# Patient Record
Sex: FEMALE | Race: WHITE | NOT HISPANIC OR LATINO | Employment: OTHER | URBAN - METROPOLITAN AREA
[De-identification: names, ages, dates, MRNs, and addresses within clinical notes are randomized per-mention and may not be internally consistent; named-entity substitution may affect disease eponyms.]

---

## 2023-11-17 PROBLEM — K63.5 BENIGN COLONIC POLYP: Status: ACTIVE | Noted: 2023-11-17

## 2023-11-17 PROBLEM — E05.10 THYROID NODULE, TOXIC OR WITH HYPERTHYROIDISM: Status: ACTIVE | Noted: 2023-11-17

## 2023-11-17 PROBLEM — I87.332: Status: ACTIVE | Noted: 2023-11-17

## 2023-11-17 PROBLEM — R92.2 DENSE BREASTS: Status: ACTIVE | Noted: 2023-11-17

## 2023-11-17 PROBLEM — I83.892 VARICOSE VEINS OF LEFT LOWER EXTREMITY WITH COMPLICATIONS: Status: ACTIVE | Noted: 2023-11-17

## 2023-11-17 PROBLEM — E04.1 LEFT THYROID NODULE: Status: ACTIVE | Noted: 2023-11-17

## 2023-11-17 PROBLEM — R79.9 ABNORMAL BLOOD CHEMISTRY: Status: ACTIVE | Noted: 2023-11-17

## 2023-11-17 PROBLEM — C50.911 BREAST CANCER, RIGHT (MULTI): Status: ACTIVE | Noted: 2023-11-17

## 2023-11-17 PROBLEM — R53.83 FATIGUE: Status: ACTIVE | Noted: 2023-11-17

## 2023-11-17 PROBLEM — R92.8 ABNORMAL MAMMOGRAM: Status: ACTIVE | Noted: 2023-11-17

## 2023-11-17 PROBLEM — S81.809A OPEN WOUND OF LEG: Status: ACTIVE | Noted: 2023-11-17

## 2023-11-17 PROBLEM — R92.30 DENSE BREASTS: Status: ACTIVE | Noted: 2023-11-17

## 2023-11-17 PROBLEM — E04.2 MULTIPLE THYROID NODULES: Status: ACTIVE | Noted: 2023-11-17

## 2023-11-17 PROBLEM — R30.0 DYSURIA: Status: ACTIVE | Noted: 2023-11-17

## 2023-11-17 PROBLEM — N63.10 MASS OF BREAST, RIGHT: Status: ACTIVE | Noted: 2023-11-17

## 2023-11-17 PROBLEM — E55.9 VITAMIN D DEFICIENCY: Status: ACTIVE | Noted: 2023-11-17

## 2023-11-17 RX ORDER — CALCIUM CITRATE MALATE/VIT D3 250 MG-2.5
TABLET ORAL
COMMUNITY

## 2023-11-17 RX ORDER — ANASTROZOLE 1 MG/1
1 TABLET ORAL DAILY
COMMUNITY
End: 2024-01-04 | Stop reason: ALTCHOICE

## 2023-11-17 RX ORDER — DORZOLAMIDE HYDROCHLORIDE AND TIMOLOL MALEATE 20; 5 MG/ML; MG/ML
1 SOLUTION/ DROPS OPHTHALMIC 2 TIMES DAILY
COMMUNITY
Start: 2023-04-21 | End: 2024-04-20

## 2023-11-17 RX ORDER — LATANOPROST 50 UG/ML
1 SOLUTION/ DROPS OPHTHALMIC
COMMUNITY
Start: 2023-04-21

## 2023-11-17 RX ORDER — CHOLECALCIFEROL (VITAMIN D3) 25 MCG
TABLET ORAL
COMMUNITY

## 2023-11-17 RX ORDER — MULTIVIT-MIN/FA/LYCOPEN/LUTEIN .4-300-25
TABLET ORAL
COMMUNITY

## 2023-12-08 ENCOUNTER — HOSPITAL ENCOUNTER (OUTPATIENT)
Dept: RADIOLOGY | Facility: HOSPITAL | Age: 73
Discharge: HOME | End: 2023-12-08
Payer: MEDICARE

## 2023-12-08 VITALS — HEIGHT: 64 IN | WEIGHT: 188 LBS | BODY MASS INDEX: 32.1 KG/M2

## 2023-12-08 DIAGNOSIS — C50.919 MALIGNANT NEOPLASM OF UNSPECIFIED SITE OF UNSPECIFIED FEMALE BREAST (MULTI): ICD-10-CM

## 2023-12-08 DIAGNOSIS — Z12.31 ENCOUNTER FOR SCREENING MAMMOGRAM FOR MALIGNANT NEOPLASM OF BREAST: ICD-10-CM

## 2023-12-08 PROCEDURE — 77067 SCR MAMMO BI INCL CAD: CPT | Mod: BILATERAL PROCEDURE | Performed by: RADIOLOGY

## 2023-12-08 PROCEDURE — 77063 BREAST TOMOSYNTHESIS BI: CPT | Mod: BILATERAL PROCEDURE | Performed by: RADIOLOGY

## 2023-12-08 PROCEDURE — 77063 BREAST TOMOSYNTHESIS BI: CPT

## 2023-12-08 NOTE — PROGRESS NOTES
Patient ID: Charlotte Daniels is a 73 y.o. female.  Cancer History:   Treatment Synopsis:       1. 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  12/20/17  2. Status post adjuvant radiation therapy to right breast.   3. Started on anastrozole 3/27/18. Completed 5 yrs of therapy in 3/2023.      Subjective    HPI  Seen in follow up for her history of R breast cancer.   Energy is lower than last year. Still does normal activities, but goes slower. Still gardens, etc.   Appetite is fine. Upset about weight.   Denies sob/chest pain. Denies cough/fevers lately.   Denies n/v.   Bowels okay.   Denies lumps in chest.   R knee hurts at times, but otherwise denies new or unusual pain. Hasn't had her knee checked.       Objective    BSA: 1.97 meters squared  /87 (BP Location: Right arm, Patient Position: Sitting)   Pulse 88   Temp 36.2 °C (97.2 °F) (Temporal)   Resp 16   Wt 85.6 kg (188 lb 11.4 oz)   SpO2 98%   BMI 32.39 kg/m²      Physical Exam  Vitals reviewed.   Eyes:      General: No scleral icterus.  Cardiovascular:      Rate and Rhythm: Normal rate and regular rhythm.   Pulmonary:      Effort: Pulmonary effort is normal. No respiratory distress.      Breath sounds: Normal breath sounds. No wheezing or rales.   Abdominal:      General: Bowel sounds are normal. There is no distension.      Palpations: Abdomen is soft. There is no mass.      Tenderness: There is no abdominal tenderness. There is no guarding.   Musculoskeletal:      Right lower leg: No edema.      Left lower leg: No edema.      Comments: No pain to palpation of spine    Lymphadenopathy:      Cervical: No cervical adenopathy.      Comments: No adenopathy head/neck/axilla  R lumpectomy noted   No lumps or nodules appreciated in either breast   Skin:     General: Skin is warm and dry.   Neurological:      Mental Status: She is  alert and oriented to person, place, and time.   Psychiatric:         Mood and Affect: Mood normal.         Behavior: Behavior normal.         Performance Status:  Symptomatic; fully ambulatory      Assessment/Plan   Mammogram on 12/8- report pending    As long as her mammogram is read as benign, she can follow up here prn. She can get her annual mammogram and exam through her PCP. Patient is agreeable.     Re: her weight. Recommended a diet rich in a variety of vegetables and fruits. Limit red meat. Limit sugar intake. Watch serving sizes. Discussed tracking intake on an gem.     Encouraged exercise. Aim for at least 150 min of exercise per week. She walks some and does some weights, but admits to not really exercising over the summer.     Re: slowing down. She is sleeping okay. Would normally start with checking labs such as cbc, tsh. She is seeing her PCP next month and will be getting labs then. She will talk to her PCP about her knee discomfort.               Miryam Elaine, APRN-CNP

## 2023-12-11 ENCOUNTER — OFFICE VISIT (OUTPATIENT)
Dept: HEMATOLOGY/ONCOLOGY | Facility: CLINIC | Age: 73
End: 2023-12-11
Payer: MEDICARE

## 2023-12-11 VITALS
SYSTOLIC BLOOD PRESSURE: 141 MMHG | RESPIRATION RATE: 16 BRPM | DIASTOLIC BLOOD PRESSURE: 87 MMHG | TEMPERATURE: 97.2 F | BODY MASS INDEX: 32.39 KG/M2 | OXYGEN SATURATION: 98 % | HEART RATE: 88 BPM | WEIGHT: 188.71 LBS

## 2023-12-11 DIAGNOSIS — Z17.0 MALIGNANT NEOPLASM OF RIGHT BREAST IN FEMALE, ESTROGEN RECEPTOR POSITIVE, UNSPECIFIED SITE OF BREAST (MULTI): Primary | ICD-10-CM

## 2023-12-11 DIAGNOSIS — C50.911 MALIGNANT NEOPLASM OF RIGHT BREAST IN FEMALE, ESTROGEN RECEPTOR POSITIVE, UNSPECIFIED SITE OF BREAST (MULTI): Primary | ICD-10-CM

## 2023-12-11 PROCEDURE — 1126F AMNT PAIN NOTED NONE PRSNT: CPT | Performed by: NURSE PRACTITIONER

## 2023-12-11 PROCEDURE — 99213 OFFICE O/P EST LOW 20 MIN: CPT | Performed by: NURSE PRACTITIONER

## 2023-12-11 PROCEDURE — 1159F MED LIST DOCD IN RCRD: CPT | Performed by: NURSE PRACTITIONER

## 2023-12-11 RX ORDER — BROMFENAC 1.03 MG/ML
1 SOLUTION/ DROPS OPHTHALMIC 2 TIMES DAILY
COMMUNITY
End: 2024-01-04 | Stop reason: ALTCHOICE

## 2023-12-11 ASSESSMENT — ENCOUNTER SYMPTOMS
LOSS OF SENSATION IN FEET: 0
OCCASIONAL FEELINGS OF UNSTEADINESS: 0
DEPRESSION: 0

## 2023-12-11 ASSESSMENT — PATIENT HEALTH QUESTIONNAIRE - PHQ9
2. FEELING DOWN, DEPRESSED OR HOPELESS: NOT AT ALL
1. LITTLE INTEREST OR PLEASURE IN DOING THINGS: NOT AT ALL
SUM OF ALL RESPONSES TO PHQ9 QUESTIONS 1 AND 2: 0

## 2023-12-11 ASSESSMENT — PAIN SCALES - GENERAL: PAINLEVEL: 0-NO PAIN

## 2023-12-11 ASSESSMENT — COLUMBIA-SUICIDE SEVERITY RATING SCALE - C-SSRS
1. IN THE PAST MONTH, HAVE YOU WISHED YOU WERE DEAD OR WISHED YOU COULD GO TO SLEEP AND NOT WAKE UP?: NO
2. HAVE YOU ACTUALLY HAD ANY THOUGHTS OF KILLING YOURSELF?: NO
6. HAVE YOU EVER DONE ANYTHING, STARTED TO DO ANYTHING, OR PREPARED TO DO ANYTHING TO END YOUR LIFE?: NO

## 2023-12-12 ENCOUNTER — TELEPHONE (OUTPATIENT)
Dept: HEMATOLOGY/ONCOLOGY | Facility: CLINIC | Age: 73
End: 2023-12-12
Payer: MEDICARE

## 2023-12-12 NOTE — TELEPHONE ENCOUNTER
BI mammo bilateral screening tomosynthesis    Result Date: 12/12/2023  Impression: No mammographic evidence of malignancy.   BI-RADS CATEGORY: BI-RADS Category:  2 Benign. Recommendation:  Routine Screening Mammogram in 1 Year. Recommended Date:  1 Year. Laterality:  Bilateral.   For any future breast imaging appointments, please call [phone]).     MACRO: None   Signed by: Singh Wu 12/12/2023 4:23 PM Dictation workstation:   [id number]        Called patient with mammogram read. It is fine. Left message with her spouse.

## 2024-01-04 ENCOUNTER — OFFICE VISIT (OUTPATIENT)
Dept: PRIMARY CARE | Facility: CLINIC | Age: 74
End: 2024-01-04
Payer: MEDICARE

## 2024-01-04 VITALS
RESPIRATION RATE: 16 BRPM | TEMPERATURE: 97 F | HEART RATE: 80 BPM | WEIGHT: 190 LBS | BODY MASS INDEX: 34.96 KG/M2 | DIASTOLIC BLOOD PRESSURE: 86 MMHG | HEIGHT: 62 IN | SYSTOLIC BLOOD PRESSURE: 142 MMHG

## 2024-01-04 DIAGNOSIS — E55.9 VITAMIN D DEFICIENCY: ICD-10-CM

## 2024-01-04 DIAGNOSIS — Z00.00 ROUTINE GENERAL MEDICAL EXAMINATION AT HEALTH CARE FACILITY: Primary | ICD-10-CM

## 2024-01-04 DIAGNOSIS — E05.10 THYROID NODULE, TOXIC OR WITH HYPERTHYROIDISM: ICD-10-CM

## 2024-01-04 DIAGNOSIS — Z23 NEED FOR VACCINATION: ICD-10-CM

## 2024-01-04 DIAGNOSIS — Z17.0 MALIGNANT NEOPLASM OF RIGHT BREAST IN FEMALE, ESTROGEN RECEPTOR POSITIVE, UNSPECIFIED SITE OF BREAST (MULTI): ICD-10-CM

## 2024-01-04 DIAGNOSIS — Z13.220 LIPID SCREENING: ICD-10-CM

## 2024-01-04 DIAGNOSIS — E89.0 HISTORY OF PARTIAL THYROIDECTOMY: ICD-10-CM

## 2024-01-04 DIAGNOSIS — C50.911 MALIGNANT NEOPLASM OF RIGHT BREAST IN FEMALE, ESTROGEN RECEPTOR POSITIVE, UNSPECIFIED SITE OF BREAST (MULTI): ICD-10-CM

## 2024-01-04 DIAGNOSIS — R53.83 OTHER FATIGUE: ICD-10-CM

## 2024-01-04 DIAGNOSIS — Z12.31 ENCOUNTER FOR SCREENING MAMMOGRAM FOR MALIGNANT NEOPLASM OF BREAST: ICD-10-CM

## 2024-01-04 PROBLEM — H52.13 HIGH MYOPIA, BOTH EYES: Status: ACTIVE | Noted: 2018-04-10

## 2024-01-04 PROBLEM — H35.371 EPIRETINAL MEMBRANE (ERM) OF RIGHT EYE: Status: ACTIVE | Noted: 2018-03-28

## 2024-01-04 PROBLEM — H47.393 CUP TO DISC ASYMMETRY, BILATERAL: Status: ACTIVE | Noted: 2018-04-10

## 2024-01-04 PROBLEM — H43.813 PVD (POSTERIOR VITREOUS DETACHMENT), BOTH EYES: Status: ACTIVE | Noted: 2018-03-06

## 2024-01-04 PROBLEM — Z96.1 PSEUDOPHAKIA, BOTH EYES: Status: ACTIVE | Noted: 2018-08-06

## 2024-01-04 PROCEDURE — 1157F ADVNC CARE PLAN IN RCRD: CPT | Performed by: NURSE PRACTITIONER

## 2024-01-04 PROCEDURE — 1036F TOBACCO NON-USER: CPT | Performed by: NURSE PRACTITIONER

## 2024-01-04 PROCEDURE — 1160F RVW MEDS BY RX/DR IN RCRD: CPT | Performed by: NURSE PRACTITIONER

## 2024-01-04 PROCEDURE — 1123F ACP DISCUSS/DSCN MKR DOCD: CPT | Performed by: NURSE PRACTITIONER

## 2024-01-04 PROCEDURE — 1159F MED LIST DOCD IN RCRD: CPT | Performed by: NURSE PRACTITIONER

## 2024-01-04 PROCEDURE — G0439 PPPS, SUBSEQ VISIT: HCPCS | Performed by: NURSE PRACTITIONER

## 2024-01-04 PROCEDURE — G0008 ADMIN INFLUENZA VIRUS VAC: HCPCS | Performed by: NURSE PRACTITIONER

## 2024-01-04 PROCEDURE — 1126F AMNT PAIN NOTED NONE PRSNT: CPT | Performed by: NURSE PRACTITIONER

## 2024-01-04 PROCEDURE — 1170F FXNL STATUS ASSESSED: CPT | Performed by: NURSE PRACTITIONER

## 2024-01-04 PROCEDURE — 90662 IIV NO PRSV INCREASED AG IM: CPT | Performed by: NURSE PRACTITIONER

## 2024-01-04 RX ORDER — BRIMONIDINE TARTRATE 2 MG/ML
1 SOLUTION/ DROPS OPHTHALMIC 2 TIMES DAILY
COMMUNITY

## 2024-01-04 ASSESSMENT — PATIENT HEALTH QUESTIONNAIRE - PHQ9
2. FEELING DOWN, DEPRESSED OR HOPELESS: NOT AT ALL
1. LITTLE INTEREST OR PLEASURE IN DOING THINGS: NOT AT ALL
SUM OF ALL RESPONSES TO PHQ9 QUESTIONS 1 & 2: 0
SUM OF ALL RESPONSES TO PHQ9 QUESTIONS 1 AND 2: 0
2. FEELING DOWN, DEPRESSED OR HOPELESS: NOT AT ALL
1. LITTLE INTEREST OR PLEASURE IN DOING THINGS: NOT AT ALL

## 2024-01-04 ASSESSMENT — ACTIVITIES OF DAILY LIVING (ADL)
TAKING_MEDICATION: INDEPENDENT
DRESSING: INDEPENDENT
MANAGING_FINANCES: INDEPENDENT
GROCERY_SHOPPING: INDEPENDENT
BATHING: INDEPENDENT
DOING_HOUSEWORK: INDEPENDENT

## 2024-01-04 ASSESSMENT — ENCOUNTER SYMPTOMS
OCCASIONAL FEELINGS OF UNSTEADINESS: 0
LOSS OF SENSATION IN FEET: 0
DEPRESSION: 0

## 2024-01-04 NOTE — PROGRESS NOTES
"Subjective   Reason for Visit: Charlotte Daniels is an 74 y.o. female here for a Medicare Wellness visit.     Past Medical, Surgical, and Family History reviewed and updated in chart.    She saw Miryam Elaine on 12/11/2023, who discharged her from the breast program.     She eats protein, vegetables, and a starch. She is working on exercise. She drinks tea but is trying to increase her water intake. She has 2-3 cups of coffee per day.     Well Adult Physical   Patient here for a comprehensive physical exam.The patient reports no problems    Do you take any herbs or supplements that were not prescribed by a doctor? not asked   Are you taking calcium supplements? yes   Are you taking aspirin daily? no     History:  LMP: No LMP recorded. Patient is postmenopausal.  Mammogram 12/2023, Colonoscopy 11/2020            Patient Care Team:  BILL Villra as PCP - General (Internal Medicine)     Review of Systems   Constitutional:  Negative for chills, fatigue and fever.   HENT:  Negative for congestion, ear pain, rhinorrhea, sinus pressure and sore throat.    Eyes:  Negative for pain, discharge and itching.   Respiratory:  Negative for cough, shortness of breath and wheezing.    Cardiovascular:  Negative for chest pain and palpitations.   Gastrointestinal:  Negative for constipation, diarrhea, nausea and vomiting.   Genitourinary:  Negative for difficulty urinating and dysuria.   Musculoskeletal:  Negative for back pain, joint swelling and myalgias.   Skin:  Negative for color change.   Neurological:  Negative for headaches.   Hematological:  Negative for adenopathy.   Psychiatric/Behavioral:  Negative for decreased concentration. The patient is not nervous/anxious.        Objective   Vitals:  /86   Pulse 80   Temp 36.1 °C (97 °F) (Temporal)   Resp 16   Ht 1.575 m (5' 2\")   Wt 86.2 kg (190 lb)   BMI 34.75 kg/m²       Physical Exam  Constitutional:       General: She is not in acute distress.     " Appearance: She is not ill-appearing.   HENT:      Head: Normocephalic and atraumatic.      Right Ear: Tympanic membrane, ear canal and external ear normal.      Left Ear: Tympanic membrane, ear canal and external ear normal.      Nose: Nose normal.      Mouth/Throat:      Mouth: Mucous membranes are moist.      Pharynx: Oropharynx is clear.   Eyes:      Conjunctiva/sclera: Conjunctivae normal.      Pupils: Pupils are equal, round, and reactive to light.   Cardiovascular:      Rate and Rhythm: Normal rate and regular rhythm.      Pulses: Normal pulses.      Heart sounds: Normal heart sounds.   Pulmonary:      Effort: Pulmonary effort is normal. No respiratory distress.      Breath sounds: Normal breath sounds.   Abdominal:      General: Bowel sounds are normal.      Palpations: Abdomen is soft.      Tenderness: There is no abdominal tenderness.   Musculoskeletal:         General: Normal range of motion.   Skin:     General: Skin is warm and dry.   Neurological:      General: No focal deficit present.      Mental Status: She is alert and oriented to person, place, and time.   Psychiatric:         Mood and Affect: Mood normal.         Behavior: Behavior normal.         Thought Content: Thought content normal.         Judgment: Judgment normal.         Assessment/Plan   Problem List Items Addressed This Visit       Breast cancer, right (CMS/HCC)    Fatigue    Relevant Orders    CBC and Auto Differential (Completed)    Comprehensive Metabolic Panel (Completed)    Thyroid nodule, toxic or with hyperthyroidism    Relevant Orders    TSH with reflex to Free T4 if abnormal (Completed)    Vitamin D deficiency    Relevant Orders    Vitamin D 25-Hydroxy,Total (for eval of Vitamin D levels) (Completed)    History of partial thyroidectomy    Relevant Orders    TSH with reflex to Free T4 if abnormal (Completed)     Other Visit Diagnoses       Routine general medical examination at health care facility    -  Primary    Relevant  Orders    1 Year Follow Up In [facility] - PCP - Wellness Exam    Encounter for screening mammogram for malignant neoplasm of breast        Relevant Orders    BI mammo bilateral screening tomosynthesis    Need for vaccination        Relevant Orders    Flu vaccine, high dose seasonal, preservative free (Completed)    Lipid screening        Relevant Orders    Lipid Panel (Completed)          Patient Instructions   Patient to continue medications as ordered. Have fasting labs drawn, and we will call with results when available. Follow-up in 1 year, or sooner if needed. Call the office if any problems or concerns in the meantime.

## 2024-01-12 ENCOUNTER — LAB (OUTPATIENT)
Dept: LAB | Facility: LAB | Age: 74
End: 2024-01-12
Payer: MEDICARE

## 2024-01-12 DIAGNOSIS — R53.83 OTHER FATIGUE: ICD-10-CM

## 2024-01-12 DIAGNOSIS — E05.10 THYROID NODULE, TOXIC OR WITH HYPERTHYROIDISM: ICD-10-CM

## 2024-01-12 DIAGNOSIS — Z13.220 LIPID SCREENING: ICD-10-CM

## 2024-01-12 DIAGNOSIS — E55.9 VITAMIN D DEFICIENCY: ICD-10-CM

## 2024-01-12 DIAGNOSIS — E89.0 HISTORY OF PARTIAL THYROIDECTOMY: ICD-10-CM

## 2024-01-12 LAB
25(OH)D3 SERPL-MCNC: 58 NG/ML (ref 30–100)
ALBUMIN SERPL BCP-MCNC: 4.4 G/DL (ref 3.4–5)
ALP SERPL-CCNC: 74 U/L (ref 33–136)
ALT SERPL W P-5'-P-CCNC: 17 U/L (ref 7–45)
ANION GAP SERPL CALC-SCNC: 12 MMOL/L (ref 10–20)
AST SERPL W P-5'-P-CCNC: 18 U/L (ref 9–39)
BASOPHILS # BLD AUTO: 0.05 X10*3/UL (ref 0–0.1)
BASOPHILS NFR BLD AUTO: 1 %
BILIRUB SERPL-MCNC: 1.1 MG/DL (ref 0–1.2)
BUN SERPL-MCNC: 9 MG/DL (ref 6–23)
CALCIUM SERPL-MCNC: 9.4 MG/DL (ref 8.6–10.3)
CHLORIDE SERPL-SCNC: 100 MMOL/L (ref 98–107)
CHOLEST SERPL-MCNC: 237 MG/DL (ref 0–199)
CHOLESTEROL/HDL RATIO: 3.7
CO2 SERPL-SCNC: 27 MMOL/L (ref 21–32)
CREAT SERPL-MCNC: 0.66 MG/DL (ref 0.5–1.05)
EGFRCR SERPLBLD CKD-EPI 2021: >90 ML/MIN/1.73M*2
EOSINOPHIL # BLD AUTO: 0.15 X10*3/UL (ref 0–0.4)
EOSINOPHIL NFR BLD AUTO: 3.1 %
ERYTHROCYTE [DISTWIDTH] IN BLOOD BY AUTOMATED COUNT: 12.3 % (ref 11.5–14.5)
GLUCOSE SERPL-MCNC: 103 MG/DL (ref 74–99)
HCT VFR BLD AUTO: 44.1 % (ref 36–46)
HDLC SERPL-MCNC: 63.9 MG/DL
HGB BLD-MCNC: 14.5 G/DL (ref 12–16)
IMM GRANULOCYTES # BLD AUTO: 0 X10*3/UL (ref 0–0.5)
IMM GRANULOCYTES NFR BLD AUTO: 0 % (ref 0–0.9)
LDLC SERPL CALC-MCNC: 145 MG/DL
LYMPHOCYTES # BLD AUTO: 1.51 X10*3/UL (ref 0.8–3)
LYMPHOCYTES NFR BLD AUTO: 31.3 %
MCH RBC QN AUTO: 31 PG (ref 26–34)
MCHC RBC AUTO-ENTMCNC: 32.9 G/DL (ref 32–36)
MCV RBC AUTO: 94 FL (ref 80–100)
MONOCYTES # BLD AUTO: 0.42 X10*3/UL (ref 0.05–0.8)
MONOCYTES NFR BLD AUTO: 8.7 %
NEUTROPHILS # BLD AUTO: 2.7 X10*3/UL (ref 1.6–5.5)
NEUTROPHILS NFR BLD AUTO: 55.9 %
NON HDL CHOLESTEROL: 173 MG/DL (ref 0–149)
NRBC BLD-RTO: 0 /100 WBCS (ref 0–0)
PLATELET # BLD AUTO: 183 X10*3/UL (ref 150–450)
POTASSIUM SERPL-SCNC: 3.7 MMOL/L (ref 3.5–5.3)
PROT SERPL-MCNC: 7.5 G/DL (ref 6.4–8.2)
RBC # BLD AUTO: 4.68 X10*6/UL (ref 4–5.2)
SODIUM SERPL-SCNC: 135 MMOL/L (ref 136–145)
T4 FREE SERPL-MCNC: 0.81 NG/DL (ref 0.61–1.12)
TRIGL SERPL-MCNC: 141 MG/DL (ref 0–149)
TSH SERPL-ACNC: 4.7 MIU/L (ref 0.44–3.98)
VLDL: 28 MG/DL (ref 0–40)
WBC # BLD AUTO: 4.8 X10*3/UL (ref 4.4–11.3)

## 2024-01-12 PROCEDURE — 80053 COMPREHEN METABOLIC PANEL: CPT

## 2024-01-12 PROCEDURE — 36415 COLL VENOUS BLD VENIPUNCTURE: CPT

## 2024-01-12 PROCEDURE — 80061 LIPID PANEL: CPT

## 2024-01-12 PROCEDURE — 84439 ASSAY OF FREE THYROXINE: CPT

## 2024-01-12 PROCEDURE — 85025 COMPLETE CBC W/AUTO DIFF WBC: CPT

## 2024-01-12 PROCEDURE — 82306 VITAMIN D 25 HYDROXY: CPT

## 2024-01-12 PROCEDURE — 84443 ASSAY THYROID STIM HORMONE: CPT

## 2024-01-17 ASSESSMENT — ENCOUNTER SYMPTOMS
SORE THROAT: 0
CONSTIPATION: 0
EYE ITCHING: 0
BACK PAIN: 0
DECREASED CONCENTRATION: 0
PALPITATIONS: 0
EYE PAIN: 0
DYSURIA: 0
COLOR CHANGE: 0
DIFFICULTY URINATING: 0
COUGH: 0
ADENOPATHY: 0
SHORTNESS OF BREATH: 0
EYE DISCHARGE: 0
JOINT SWELLING: 0
WHEEZING: 0
DIARRHEA: 0
NAUSEA: 0
RHINORRHEA: 0
FEVER: 0
CHILLS: 0
FATIGUE: 0
VOMITING: 0
SINUS PRESSURE: 0
NERVOUS/ANXIOUS: 0
MYALGIAS: 0
HEADACHES: 0

## 2024-01-18 NOTE — PATIENT INSTRUCTIONS
Patient to continue medications as ordered. Have fasting labs drawn, and we will call with results when available. Follow-up in 1 year, or sooner if needed. Call the office if any problems or concerns in the meantime.

## 2024-03-12 PROBLEM — I87.332: Status: RESOLVED | Noted: 2023-11-17 | Resolved: 2024-03-12

## 2024-04-05 ENCOUNTER — TELEPHONE (OUTPATIENT)
Dept: PRIMARY CARE | Facility: CLINIC | Age: 74
End: 2024-04-05
Payer: MEDICARE

## 2024-04-05 NOTE — TELEPHONE ENCOUNTER
Patient called with a concern.  She stated she has had two such episodes as will be described.    When pushing herself physically she is having bathroom troubles.  She feels she needs to remain close to a facility.  When she needs to go, she needs to go and there's no waiting.    Each episode has lasted about a week to a week and a half. It's not continuous but it may last a day or two off and on during the episode.    This second occurrence happened while on a cruise.  She had to use the bathroom multiple times.  The stool wasn't completely solid but not runny.  It was more mucous like.  She is concerned and not sure what do.      Can she get a phone call?  Does she need an appointment?

## 2024-06-06 ENCOUNTER — OFFICE VISIT (OUTPATIENT)
Dept: PRIMARY CARE | Facility: CLINIC | Age: 74
End: 2024-06-06
Payer: MEDICARE

## 2024-06-06 ENCOUNTER — LAB (OUTPATIENT)
Dept: LAB | Facility: LAB | Age: 74
End: 2024-06-06
Payer: MEDICARE

## 2024-06-06 VITALS
DIASTOLIC BLOOD PRESSURE: 80 MMHG | BODY MASS INDEX: 34.96 KG/M2 | SYSTOLIC BLOOD PRESSURE: 134 MMHG | HEIGHT: 62 IN | WEIGHT: 190 LBS | OXYGEN SATURATION: 97 % | RESPIRATION RATE: 18 BRPM | HEART RATE: 52 BPM

## 2024-06-06 DIAGNOSIS — E05.10 THYROID NODULE, TOXIC OR WITH HYPERTHYROIDISM: ICD-10-CM

## 2024-06-06 DIAGNOSIS — R53.83 OTHER FATIGUE: Primary | ICD-10-CM

## 2024-06-06 DIAGNOSIS — E55.9 VITAMIN D DEFICIENCY: ICD-10-CM

## 2024-06-06 DIAGNOSIS — E89.0 HISTORY OF PARTIAL THYROIDECTOMY: ICD-10-CM

## 2024-06-06 DIAGNOSIS — R53.83 OTHER FATIGUE: ICD-10-CM

## 2024-06-06 LAB
T4 FREE SERPL-MCNC: 0.74 NG/DL (ref 0.61–1.12)
TSH SERPL-ACNC: 4.12 MIU/L (ref 0.44–3.98)

## 2024-06-06 PROCEDURE — 36415 COLL VENOUS BLD VENIPUNCTURE: CPT

## 2024-06-06 PROCEDURE — 1159F MED LIST DOCD IN RCRD: CPT | Performed by: NURSE PRACTITIONER

## 2024-06-06 PROCEDURE — 1157F ADVNC CARE PLAN IN RCRD: CPT | Performed by: NURSE PRACTITIONER

## 2024-06-06 PROCEDURE — 1123F ACP DISCUSS/DSCN MKR DOCD: CPT | Performed by: NURSE PRACTITIONER

## 2024-06-06 PROCEDURE — 84439 ASSAY OF FREE THYROXINE: CPT

## 2024-06-06 PROCEDURE — 1160F RVW MEDS BY RX/DR IN RCRD: CPT | Performed by: NURSE PRACTITIONER

## 2024-06-06 PROCEDURE — 84443 ASSAY THYROID STIM HORMONE: CPT

## 2024-06-06 PROCEDURE — 99213 OFFICE O/P EST LOW 20 MIN: CPT | Performed by: NURSE PRACTITIONER

## 2024-06-06 RX ORDER — DORZOLAMIDE HYDROCHLORIDE AND TIMOLOL MALEATE 20; 5 MG/ML; MG/ML
1 SOLUTION/ DROPS OPHTHALMIC 2 TIMES DAILY
COMMUNITY

## 2024-06-06 ASSESSMENT — ENCOUNTER SYMPTOMS
EYE PAIN: 0
FEVER: 0
NUMBNESS: 0
SHORTNESS OF BREATH: 0
WOUND: 0
WEAKNESS: 0
SINUS PAIN: 0
ARTHRALGIAS: 0
FATIGUE: 1
SORE THROAT: 0
CHEST TIGHTNESS: 0
DIARRHEA: 1
CHILLS: 0
CONFUSION: 0
LIGHT-HEADEDNESS: 0
SINUS PRESSURE: 0
DECREASED CONCENTRATION: 0
VOMITING: 0
CONSTIPATION: 1
NAUSEA: 0
ABDOMINAL PAIN: 0
MYALGIAS: 0
DYSURIA: 0
HEADACHES: 0

## 2024-06-06 NOTE — PROGRESS NOTES
"Subjective   Patient ID: Charlotte Daniels is a 74 y.o. female who presents for abdominal concerns.    HPI     Review of Systems    Objective   /80   Pulse 52   Resp 18   Ht 1.575 m (5' 2\")   Wt 86.2 kg (190 lb)   SpO2 97%   BMI 34.75 kg/m²     Physical Exam    Assessment/Plan          "

## 2024-06-06 NOTE — PROGRESS NOTES
"Subjective   Patient ID: Charlotte Daniels is a 74 y.o. female who presents for abdominal concerns.    Patient presents today with complaints of abdominal issues.  She said that she was travelling in March and had an issue with constipation.  She noticed she had some blood when wiping and wanted to follow-up with this.  She had a colonoscopy in 2022 in which they did find internal hemorrhoids and she believe this may be the cause of the blood. She hasbn't had any blood in the stool since then. She normally has a few bowel movements every morning.     She says her diet is well-balanced with proteins, fruits, and vegetables.  She drinks water throughout the day and walks for exercise.  She drinks coffee in the morning but has no other caffeine intake.  She does state that she is feeling more fatigued and tries to \"push through\" the days.  She would like to discuss potential issues with her thyroid as well. She is sleeping well at night.         Review of Systems   Constitutional:  Positive for fatigue. Negative for chills and fever.   HENT:  Negative for congestion, sinus pressure, sinus pain and sore throat.    Eyes:  Negative for pain.   Respiratory:  Negative for chest tightness and shortness of breath.    Cardiovascular:  Negative for chest pain.   Gastrointestinal:  Positive for constipation and diarrhea. Negative for abdominal pain, nausea and vomiting.   Genitourinary:  Negative for dysuria.   Musculoskeletal:  Negative for arthralgias and myalgias.   Skin:  Negative for rash and wound.   Neurological:  Negative for weakness, light-headedness, numbness and headaches.   Psychiatric/Behavioral:  Negative for confusion and decreased concentration.        Objective   /80   Pulse 52   Resp 18   Ht 1.575 m (5' 2\")   Wt 86.2 kg (190 lb)   SpO2 97%   BMI 34.75 kg/m²     Physical Exam  Constitutional:       Appearance: Normal appearance.   HENT:      Head: Normocephalic and atraumatic.      Mouth/Throat:    "   Mouth: Mucous membranes are moist.   Eyes:      Conjunctiva/sclera: Conjunctivae normal.      Pupils: Pupils are equal, round, and reactive to light.   Cardiovascular:      Rate and Rhythm: Normal rate and regular rhythm.      Heart sounds: Normal heart sounds. No murmur heard.  Pulmonary:      Effort: Pulmonary effort is normal.      Breath sounds: Normal breath sounds.   Abdominal:      General: Bowel sounds are normal. There is no distension.      Palpations: Abdomen is soft. There is no mass.      Tenderness: There is no abdominal tenderness.   Musculoskeletal:         General: Normal range of motion.      Cervical back: Normal range of motion.   Skin:     General: Skin is warm and dry.   Neurological:      General: No focal deficit present.      Mental Status: She is alert and oriented to person, place, and time.   Psychiatric:         Mood and Affect: Mood normal.         Behavior: Behavior normal.         Thought Content: Thought content normal.         Judgment: Judgment normal.         Assessment/Plan   Problem List Items Addressed This Visit       Fatigue - Primary    Relevant Orders    Tsh With Reflex To Free T4 If Abnormal (Completed)    Thyroid nodule, toxic or with hyperthyroidism    Relevant Medications    levothyroxine (Synthroid) 25 mcg tablet    Other Relevant Orders    TSH    Triiodothyronine, Free    Thyroxine, Free    Vitamin D deficiency    History of partial thyroidectomy       Patient Instructions   Encouraged to monitor bowel movement frequency and check for blood/hemorrhoids prn. Patient to have TSH drawn on the way out, will start synthroid pending results. Follow-up in 3 months, or sooner if needed. Call the office if any problems or concerns in the meantime.

## 2024-06-26 RX ORDER — LEVOTHYROXINE SODIUM 25 UG/1
25 TABLET ORAL DAILY
Qty: 90 TABLET | Refills: 1 | Status: SHIPPED | OUTPATIENT
Start: 2024-06-26 | End: 2024-12-23

## 2024-06-26 NOTE — PATIENT INSTRUCTIONS
Encouraged to monitor bowel movement frequency and check for blood/hemorrhoids prn. Patient to have TSH drawn on the way out, will start synthroid pending results. Follow-up in 3 months, or sooner if needed. Call the office if any problems or concerns in the meantime.

## 2024-09-09 DIAGNOSIS — E05.10 THYROID NODULE, TOXIC OR WITH HYPERTHYROIDISM: ICD-10-CM

## 2024-09-09 RX ORDER — LEVOTHYROXINE SODIUM 25 UG/1
25 TABLET ORAL DAILY
Qty: 30 TABLET | Refills: 1 | Status: SHIPPED | OUTPATIENT
Start: 2024-09-17 | End: 2025-03-16

## 2024-09-26 ENCOUNTER — LAB (OUTPATIENT)
Dept: LAB | Facility: LAB | Age: 74
End: 2024-09-26
Payer: MEDICARE

## 2024-09-26 DIAGNOSIS — E05.10 THYROID NODULE, TOXIC OR WITH HYPERTHYROIDISM: ICD-10-CM

## 2024-09-26 LAB
T3FREE SERPL-MCNC: 3.1 PG/ML (ref 2.3–4.2)
T4 FREE SERPL-MCNC: 0.95 NG/DL (ref 0.61–1.12)
TSH SERPL-ACNC: 3.48 MIU/L (ref 0.44–3.98)

## 2024-09-26 PROCEDURE — 84481 FREE ASSAY (FT-3): CPT

## 2024-09-26 PROCEDURE — 36415 COLL VENOUS BLD VENIPUNCTURE: CPT

## 2024-09-26 PROCEDURE — 84439 ASSAY OF FREE THYROXINE: CPT

## 2024-09-26 PROCEDURE — 84443 ASSAY THYROID STIM HORMONE: CPT

## 2024-10-11 ENCOUNTER — APPOINTMENT (OUTPATIENT)
Dept: ORTHOPEDIC SURGERY | Facility: CLINIC | Age: 74
End: 2024-10-11
Payer: MEDICARE

## 2024-10-11 ENCOUNTER — ANCILLARY PROCEDURE (OUTPATIENT)
Dept: ORTHOPEDIC SURGERY | Facility: CLINIC | Age: 74
End: 2024-10-11
Payer: MEDICARE

## 2024-10-11 ENCOUNTER — HOSPITAL ENCOUNTER (OUTPATIENT)
Dept: RADIOLOGY | Facility: HOSPITAL | Age: 74
End: 2024-10-11
Payer: MEDICARE

## 2024-10-11 ENCOUNTER — OFFICE VISIT (OUTPATIENT)
Dept: ORTHOPEDIC SURGERY | Facility: CLINIC | Age: 74
End: 2024-10-11
Payer: MEDICARE

## 2024-10-11 ENCOUNTER — HOSPITAL ENCOUNTER (OUTPATIENT)
Dept: RADIOLOGY | Facility: HOSPITAL | Age: 74
Discharge: HOME | End: 2024-10-11
Payer: MEDICARE

## 2024-10-11 ENCOUNTER — APPOINTMENT (OUTPATIENT)
Dept: RADIOLOGY | Facility: HOSPITAL | Age: 74
End: 2024-10-11
Payer: MEDICARE

## 2024-10-11 DIAGNOSIS — M25.472 PAIN AND SWELLING OF LEFT ANKLE: ICD-10-CM

## 2024-10-11 DIAGNOSIS — M25.572 PAIN AND SWELLING OF LEFT ANKLE: ICD-10-CM

## 2024-10-11 DIAGNOSIS — M25.561 RIGHT KNEE PAIN, UNSPECIFIED CHRONICITY: ICD-10-CM

## 2024-10-11 DIAGNOSIS — M17.11 OSTEOARTHRITIS OF RIGHT KNEE, UNSPECIFIED OSTEOARTHRITIS TYPE: ICD-10-CM

## 2024-10-11 DIAGNOSIS — M76.822 POSTERIOR TIBIAL TENDINITIS OF LEFT LOWER EXTREMITY: ICD-10-CM

## 2024-10-11 PROCEDURE — 73610 X-RAY EXAM OF ANKLE: CPT | Mod: LT

## 2024-10-11 PROCEDURE — 73562 X-RAY EXAM OF KNEE 3: CPT | Mod: RT

## 2024-10-11 RX ORDER — MELOXICAM 15 MG/1
15 TABLET ORAL DAILY PRN
Qty: 30 TABLET | Refills: 0 | Status: SHIPPED | OUTPATIENT
Start: 2024-10-11 | End: 2024-11-10

## 2024-10-11 NOTE — PROGRESS NOTES
CC:   Chief Complaint   Patient presents with    Left Ankle - Pain, New Patient Visit       HPI: Charlotte is a 74 y.o. female presents today for acute on chronic right knee pain which started several years. She states that the right knee pain is getting worse. She also notes acute left ankle pain and swelling which started last week. No trauma or fall. She is here for initial evaluation and x-rays.         Review of Systems   GENERAL: Negative for malaise, significant weight loss, fever  MUSCULOSKELETAL: See HPI  NEURO:  Negative for numbness / tingling     Past Medical History  Past Medical History:   Diagnosis Date    Breast cancer (Multi) 2017    Right    Personal history of diseases of the skin and subcutaneous tissue     History of urticaria    Personal history of irradiation     breast cancer    Personal history of other diseases of the circulatory system     History of hypertension    Personal history of other diseases of the digestive system     History of hemorrhoids    Personal history of other diseases of the digestive system     History of diverticulosis    Personal history of other diseases of the musculoskeletal system and connective tissue     History of backache    Personal history of other diseases of the nervous system and sense organs     History of glaucoma    Personal history of other endocrine, nutritional and metabolic disease     History of thyroid disease    Personal history of other infectious and parasitic diseases     History of varicella    Personal history of other infectious and parasitic diseases     History of infectious mononucleosis    Personal history of other infectious and parasitic diseases     History of scarlet fever       Medication review  Medication Documentation Review Audit       Reviewed by Nancy Hanson MA (Medical Assistant) on 10/11/24 at 1250      Medication Order Taking? Sig Documenting Provider Last Dose Status   brimonidine (AlphaGAN) 0.2 % ophthalmic  solution [id number] No Administer 1 drop into both eyes 2 times a day. Historical Provider, MD Taking Active   calcium citrate malate-vit D3 250 mg-2.5 mcg (100 unit) tablet [id number] No Take by mouth. Historical Provider, MD Taking Active   cholecalciferol (Vitamin D-3) 25 MCG (1000 UT) tablet [id number] No Take by mouth. Historical Provider, MD Taking Active   dorzolamide-timoloL (Cosopt) 22.3-6.8 mg/mL ophthalmic solution [id number] No Administer 1 drop into both eyes 2 times a day. Historical Provider, MD Taking Active   latanoprost (Xalatan) 0.005 % ophthalmic solution [id number] No 1 drop. Historical Provider, MD Taking Active   levothyroxine (Synthroid) 25 mcg tablet [id number]  Take 1 tablet (25 mcg) by mouth early in the morning.. Take on an empty stomach at the same time each day, either 30 to 60 minutes prior to breakfast Do not fill before September 17, 2024. Andreas Campbell, APRN-CNP  Active   multivitamin with minerals iron-free (Centrum Silver) [id number] No Take by mouth. Historical Provider, MD Taking Active                    Allergies  Allergies   Allergen Reactions    Penicillins Unknown, Angioedema and Swelling       Social History  Social History     Socioeconomic History    Marital status:      Spouse name: Not on file    Number of children: Not on file    Years of education: Not on file    Highest education level: Not on file   Occupational History    Not on file   Tobacco Use    Smoking status: Former     Types: Cigarettes    Smokeless tobacco: Never   Substance and Sexual Activity    Alcohol use: Yes    Drug use: Never    Sexual activity: Not on file   Other Topics Concern    Not on file   Social History Narrative    Not on file     Social Determinants of Health     Financial Resource Strain: Not on file   Food Insecurity: Not on file   Transportation Needs: Not on file   Physical Activity: Not on file   Stress: Not on file   Social Connections: Not on file   Intimate Partner Violence: Not  on file   Housing Stability: Not on file       Surgical History  Past Surgical History:   Procedure Laterality Date    BREAST LUMPECTOMY Right 2017    OTHER SURGICAL HISTORY  06/24/2015    Skin Debridement Left Leg    THYROIDECTOMY, PARTIAL  07/01/2015    Thyroid Surgery German-Thyroidectomy    VARICOSE VEIN SURGERY  11/21/2017    Varicose Vein Ligation       Physical Exam:  GENERAL:  Patient is awake, alert, and oriented to person place and time.  Patient appears well nourished and well kept.  Affect Calm, Not Acutely Distressed.  HEENT:  Normocephalic, Atraumatic, EOMI  CARDIOVASCULAR:  Hemodynamically stable.  RESPIRATORY:  Normal respirations with unlabored breathing.  Extremity: Left ankle shows skin is intact.  There is no erythema or warmth.  There is no clinical signs of infection.  There is no pain over the lateral malleolus.  There is no pain of the medial malleolus.  Mild pain over the posterior tibial tendon.  There is no pain over the ATF, CF or PTF ligament.  There is no pain over the deltoid ligament.  No pain over the Achilles tendon.  Negative Singleton's test.  Negative squeeze test.  Negative anterior drawer test.  Negative talar tilt test.  No pain over the anterior process of the talus.  There is no pain over the talar dome.  There is no pain at the base of the fifth metatarsal bone.  No pain of the calcaneus.  No pain over the plantar aponeurosis.  No pain of the midfoot.  Neurovascularly intact.    Right knee examination shows skin is intact.  There is no erythema or warmth.  1+ effusion.  Can flex the right knee to 130 degrees with pain.  Full extension at 0 degrees.  Pain over the medial joint line.  No pain over the lateral joint line.  There is no pain over the patellar or quadricep tendon.  No pain over the proximal tibia.  No pain over the popliteal fossa.  Positive valgus stress test with slight instability.  Negative varus stress test.  Negative Shelbie's test medially with no  instability.  Negative Shelbie's test laterally with no instability.  Negative Lachman's test.  Patellar and quadricep mechanism intact.  Negative anterior and posterior drawer test.  Negative patellar apprehension test.  Distal pulses are palpable, neurovascularly intact.  Walking with no significant antalgic gait.      Diagnostics: X-rays reviewed        Procedure: None    Assessment:   Right knee osteoarthritis  Left posterior tibial tendinitis    Plan: Charlotte presents today for evaluation for acute on chronic right knee injury secondary to severe osteoarthritis at the medial compartment and acute left ankle pain secondary to posterior tibial tendinitis, no trauma or fall.  We discussed various conservative treatment options will. We will measured her for a medial unloading OA knee brace and recommended physical therapy and Meloxicam as needed. We placed her into a lace up ankle brace for posterior tibial tendinitis. She will follow-up in 3-4 weeks for reevaluation.  We discussed the possibility of intra-articular corticosteroid injection and future viscosupplement injection.  We discussed she feels all conservative treatment options she may be candidate for a knee replacement.    No orders of the defined types were placed in this encounter.     At the conclusion of the visit there were no further questions by the patient/family regarding their plan of care.  Patient was instructed to call or return with any issues, questions, or concerns regarding their injury and/or treatment plan described above.     10/11/24 at 12:54 PM - James Lara MD  Scribe Attestation  By signing my name below, I Pernell Jairo James   attest that this documentation has been prepared under the direction and in the presence of James Lara MD.    Office: [phone]    This note was prepared using voice recognition software.  The details of this note are correct and have been reviewed, and corrected to the best of my  ability.  Some grammatical errors may persist related to the Dragon software.

## 2024-10-14 ENCOUNTER — TELEPHONE (OUTPATIENT)
Dept: ORTHOPEDIC SURGERY | Facility: CLINIC | Age: 74
End: 2024-10-14
Payer: MEDICARE

## 2024-10-14 NOTE — TELEPHONE ENCOUNTER
10/14/24 - Medial OA freestyle brace - all paperwork faxed to [employer] [facility] same day due to Medicare guidelines

## 2024-10-15 ENCOUNTER — EVALUATION (OUTPATIENT)
Dept: PHYSICAL THERAPY | Facility: CLINIC | Age: 74
End: 2024-10-15
Payer: MEDICARE

## 2024-10-15 DIAGNOSIS — M17.11 OSTEOARTHRITIS OF RIGHT KNEE, UNSPECIFIED OSTEOARTHRITIS TYPE: ICD-10-CM

## 2024-10-15 PROCEDURE — 97162 PT EVAL MOD COMPLEX 30 MIN: CPT | Mod: GP | Performed by: GENERAL ACUTE CARE HOSPITAL

## 2024-10-15 PROCEDURE — 97110 THERAPEUTIC EXERCISES: CPT | Mod: GP | Performed by: GENERAL ACUTE CARE HOSPITAL

## 2024-10-15 NOTE — PROGRESS NOTES
Patient Name: Charlotte Daniels  MRN: [id number]  Today's Date: 10/15/2024     Current Problem:  1. Osteoarthritis of right knee, unspecified osteoarthritis type  Referral to Physical Therapy          Visit 1/20    Primary Complaint/ Functional Limitation: limited to walking around the block (1/4) mile  Prior level of function: Independent   Date of onset: 2010- dancing at a wedding- the next day could not get up and walk   Cause:went to [place] at the end of Sept and walked up hills with bad shoes, when came home had a lot of stairs to do and increased pain levels   Pain Location: medial right knee   Current Pain: 8-9/10 before meds, current after meds 1-2/10   Worst Pain: 10/10  Description of pain:real sharp medial right knee , it feels sometimes like it get stuck like it wont go straight,  usually while driving or sleeping   Aggravating Activities: sitting too long   Relieving Activities: Meloxicam     Louis pose cat/cow/ down dog for back issues   Work Requirements/ Hobbies: retired  at [employer]   Prior Treatment and results of Prior treatment: none   Constitutional Symptoms: Patient Denies   Fever Chills Nausea Vomiting Loss of appetite Abdominal pain Change in bowel function Weight loss or gain Headache Unexplained fatigue Malaise Lethargy Weakness Arthralgia  Myalgia  Difficulty in sleeping Breathing trouble  Barriers to treatment/ learning: none     Gait: antalgic right knee donut brace   Significant pronation left ankle   (Figure 8 lace up brace on  Deep Tendon Reflexes 2+ bilateral and symmetrical        Patella       Achilles  RUSSELL: decrease/ better/improved        Functional Tests (R/L):     SLS   unable     Squat  left deviation decreased excursion      Stairs single     LEFS: 27/80    Evaluation, Tests and measures, Education including HEP instruction and feedback. Patient appears to be compliant and motivated to perform HEP as instructed and participate in active physical therapy as indicated. The  patient was educated on: the importance of positioning, proper posture, and body mechanics, joint mechanics and pathology, general tissue healing time, the appropriate use of heat and cold to control pain and inflammation, the importance of general therapeutic exercise, especially to stay within pain-free ROM, specific anatomy, function, & regional interdependence of involved areas, & likely cause of impairments & POC.  Patient's questions were answered to their satisfaction, & patient verbalized understanding & agreement with POC.  The patient presents to Physical Therapy with signs and symptoms consistent with the medical diagnosis of knee OA. Key impairments include:  pain and difficulty with functional activities such as stairs. Skilled PT is required to address these key impairments and to provide and progress with an appropriate home exercise program. This evaluation is of  moderate complexity due to the stable/changing/unstable nature of the patient's presentation as well as the comorbidities and medical factors included in this evaluation.  Treatment may include: Therapeutic Exercise (PROM, AA/AROM, Flexibility, Strengthening, Stabilization, HEP instruction). Therapeutic Activities (Transfers, Body Mechanics, Work Related Activities, Closed Chain, Agility and Power).   Manual Techniques (Soft tissue Mobilization, Joint mobilization/Distraction, Muscle Energy Techniques, Lymphatic Drainage, Dry Needling).  Neuromuscular Reeducation (Postural Training, Balance/Proprioception, Relaxation Techniques). Biofeedback. Aquatic Exercise. Modalities: Ultrasound, Moist Heat, Cryotherapy, Vasopneumatic with or without Cryotherapy. Electrical Stimulation (TENS/IFC/ Pre modulated for pain relief, NMES for Muscle reeducation). Gait Training. Orthotic Fit and Training. Strapping, Kinesio taping.     Patient will report resting pain on Visual Analog Scale < or =0  .   Pain at worst will be < or = 0 .   Pain with (patients  primary complaint) will be < or =  0.    Patient will have improved knee strength >/=  5/5, knee flexion >/=  5/5 to improve joint mechanics with functional tasks.    Knee Range of motion will improve knee Flexion>/=  130 , Extension >/= 0   to improve joint mechanics with functional activities.    Lower Extremity Functional Scale (LEFS) score will improve >/= 9 points to demonstrate overall improved functional abilities.    Patient will demonstrate minimal or no gait deviation with ambulating >/=  30 minutes, all surfaces, up/down stairs reciprocally.    Patient will correctly perform home exercise program Independently, demonstrated through patient teach back upon discharge.

## 2024-10-15 NOTE — LETTER
October 15, 2024    Pedrito Moody, ASHLEY  [address] [employer], [address] [place] [address]    Patient: Charlotte Daniels   YOB: 1950   Date of Visit: 10/15/2024       Dear James Lara MD  [address] [facility], [address] [place] [address],  [place] [address]    The attached plan of care is being sent to you because your patient’s medical reimbursement requires that you certify the plan of care. Your signature is required to allow uninterrupted insurance coverage.      You may indicate your approval by signing below and faxing this form back to us at Dept Fax: [phone].    Please call Dept: [phone] with any questions or concerns.    Thank you for this referral,        Pedrito Moody, ASHLEY  [address] [place] [address]    Payer: Payor: MEDICARE / Plan: MEDICARE PART A AND B / Product Type: *No Product type* /                                                                         Date:     Dear Pedrito Moody, PT,     Re: Ms. Charlotte Daniels, MRN:[id number]    I certify that I have reviewed the attached plan of care and it is medically necessary for Ms. Charlotte Daniels (1950) who is under my care.          ______________________________________                    _________________  Provider name and credentials                                           Date and time                                                                                           Plan of Care 10/15/24   Effective from: 10/15/2024  Effective to: 1/13/2025    Plan ID: [id number]            Participants as of Finalize on 10/15/2024    Name Type Comments Contact Info    James Lara MD Referring Provider  [phone]    Pedrito Moody PT Physical Therapist  [phone]       Last Plan Note     Author: Pedrito Moody PT Status: Incomplete Last edited: 10/15/2024  1:15 PM       Patient Name: Charlotte ELLIS  Jeremiah  MRN: [id number]  Today's Date: 10/15/2024     Current Problem:  1. Osteoarthritis of right knee, unspecified osteoarthritis type  Referral to Physical Therapy          Visit 1/20    Primary Complaint/ Functional Limitation: limited to walking around the block (1/4) mile  Prior level of function: Independent   Date of onset: 2010- dancing at a wedding- the next day could not get up and walk   Cause:went to [place] at the end of Sept and walked up hills with bad shoes, when came home had a lot of stairs to do and increased pain levels   Pain Location: medial right knee   Current Pain: 8-9/10 before meds, current after meds 1-2/10   Worst Pain: 10/10  Description of pain:real sharp medial right knee , it feels sometimes like it get stuck like it wont go straight,  usually while driving or sleeping   Aggravating Activities: sitting too long   Relieving Activities: Meloxicam     Louis pose cat/cow/ down dog for back issues   Work Requirements/ Hobbies: retired  at [employer]   Prior Treatment and results of Prior treatment: none   Constitutional Symptoms: Patient Denies   Fever Chills Nausea Vomiting Loss of appetite Abdominal pain Change in bowel function Weight loss or gain Headache Unexplained fatigue Malaise Lethargy Weakness Arthralgia  Myalgia  Difficulty in sleeping Breathing trouble  Barriers to treatment/ learning: none     Gait: antalgic right knee donut brace   Significant pronation left ankle   (Figure 8 lace up brace on  Deep Tendon Reflexes 2+ bilateral and symmetrical        Patella       Achilles  RUSSELL: decrease/ better/improved        Functional Tests (R/L):     SLS   unable     Squat  left deviation decreased excursion      Stairs single     LEFS: 27/80    Evaluation, Tests and measures, Education including HEP instruction and feedback. Patient appears to be compliant and motivated to perform HEP as instructed and participate in active physical therapy as indicated. The patient was educated on:  the importance of positioning, proper posture, and body mechanics, joint mechanics and pathology, general tissue healing time, the appropriate use of heat and cold to control pain and inflammation, the importance of general therapeutic exercise, especially to stay within pain-free ROM, specific anatomy, function, & regional interdependence of involved areas, & likely cause of impairments & POC.  Patient's questions were answered to their satisfaction, & patient verbalized understanding & agreement with POC.  The patient presents to Physical Therapy with signs and symptoms consistent with the medical diagnosis of knee OA. Key impairments include:  pain and difficulty with functional activities such as stairs. Skilled PT is required to address these key impairments and to provide and progress with an appropriate home exercise program. This evaluation is of  moderate complexity due to the stable/changing/unstable nature of the patient's presentation as well as the comorbidities and medical factors included in this evaluation.  Treatment may include: Therapeutic Exercise (PROM, AA/AROM, Flexibility, Strengthening, Stabilization, HEP instruction). Therapeutic Activities (Transfers, Body Mechanics, Work Related Activities, Closed Chain, Agility and Power).   Manual Techniques (Soft tissue Mobilization, Joint mobilization/Distraction, Muscle Energy Techniques, Lymphatic Drainage, Dry Needling).  Neuromuscular Reeducation (Postural Training, Balance/Proprioception, Relaxation Techniques). Biofeedback. Aquatic Exercise. Modalities: Ultrasound, Moist Heat, Cryotherapy, Vasopneumatic with or without Cryotherapy. Electrical Stimulation (TENS/IFC/ Pre modulated for pain relief, NMES for Muscle reeducation). Gait Training. Orthotic Fit and Training. Strapping, Kinesio taping.     Patient will report resting pain on Visual Analog Scale < or =0  .   Pain at worst will be < or = 0 .   Pain with (patients primary complaint) will be <  or =  0.    Patient will have improved knee strength >/=  5/5, knee flexion >/=  5/5 to improve joint mechanics with functional tasks.    Knee Range of motion will improve knee Flexion>/=  130 , Extension >/= 0   to improve joint mechanics with functional activities.    Lower Extremity Functional Scale (LEFS) score will improve >/= 9 points to demonstrate overall improved functional abilities.    Patient will demonstrate minimal or no gait deviation with ambulating >/=  30 minutes, all surfaces, up/down stairs reciprocally.    Patient will correctly perform home exercise program Independently, demonstrated through patient teach back upon discharge.         Current Participants as of 10/15/2024    Name Type Comments Contact Info    James Lara MD Referring Provider  [phone]    Signature pending    Pedrito Moody PT Physical Therapist  [phone]    Signature pending

## 2024-10-17 DIAGNOSIS — E05.10 THYROID NODULE, TOXIC OR WITH HYPERTHYROIDISM: ICD-10-CM

## 2024-10-17 RX ORDER — LEVOTHYROXINE SODIUM 25 UG/1
25 TABLET ORAL DAILY
Qty: 90 TABLET | Refills: 1 | Status: SHIPPED | OUTPATIENT
Start: 2024-10-17 | End: 2025-04-15

## 2024-10-21 ENCOUNTER — APPOINTMENT (OUTPATIENT)
Dept: PRIMARY CARE | Facility: CLINIC | Age: 74
End: 2024-10-21
Payer: MEDICARE

## 2024-10-24 ENCOUNTER — TREATMENT (OUTPATIENT)
Dept: PHYSICAL THERAPY | Facility: CLINIC | Age: 74
End: 2024-10-24
Payer: MEDICARE

## 2024-10-24 ENCOUNTER — APPOINTMENT (OUTPATIENT)
Dept: ORTHOPEDIC SURGERY | Facility: CLINIC | Age: 74
End: 2024-10-24
Payer: MEDICARE

## 2024-10-24 ENCOUNTER — APPOINTMENT (OUTPATIENT)
Dept: PHYSICAL THERAPY | Facility: CLINIC | Age: 74
End: 2024-10-24
Payer: MEDICARE

## 2024-10-24 DIAGNOSIS — M17.11 OSTEOARTHRITIS OF RIGHT KNEE, UNSPECIFIED OSTEOARTHRITIS TYPE: ICD-10-CM

## 2024-10-24 PROCEDURE — 97110 THERAPEUTIC EXERCISES: CPT | Mod: GP | Performed by: GENERAL ACUTE CARE HOSPITAL

## 2024-10-24 NOTE — PROGRESS NOTES
Patient Name: Charlotte Daniels  MRN: [id number]  Today's Date: 10/24/2024     Current Problem:  1. Osteoarthritis of right knee, unspecified osteoarthritis type  Follow Up In Physical Therapy          Visit 2/20    Subjective:  Change in pain/ pain level: 3/10  Patient comments: today feeling pretty well   Feel like ROM is so limited in knee and ankle     Objective:   Strength knee ext R/L: 31#, 42#  43# aftetr  Strength knee flex R/L: 28#/33#    ROM right: 119-4-0, 136-2    Treatment:    Therapeutic exercise (78833):  MDT knee ext in neutral  Review MDT lumbar    Assessment:   Patient notes improved pain levels resultant from activities in therapy session. Improved tissue quality noted as well as body mechanics demonstrated after cueing and corrections. Patient continues to require active therapy to progress functional capabilities with decreasing pain levels and improving mechanics with functional activities including progression of Home exercise program. Tolerance to today's treatment was good.  Patient appears motivated and compliant this date, demonstrating a working understanding of principals instructed and home program.    Plan:  Progress with functional strength as tolerated with respect to tissue healing. Manual therapy PRN to improve/maintain ROM, prevent adhesion, reduce pain.

## 2024-10-28 ENCOUNTER — TREATMENT (OUTPATIENT)
Dept: PHYSICAL THERAPY | Facility: CLINIC | Age: 74
End: 2024-10-28
Payer: MEDICARE

## 2024-10-28 DIAGNOSIS — M17.11 OSTEOARTHRITIS OF RIGHT KNEE, UNSPECIFIED OSTEOARTHRITIS TYPE: ICD-10-CM

## 2024-10-28 PROCEDURE — 97110 THERAPEUTIC EXERCISES: CPT | Mod: GP | Performed by: GENERAL ACUTE CARE HOSPITAL

## 2024-11-04 ENCOUNTER — TREATMENT (OUTPATIENT)
Dept: PHYSICAL THERAPY | Facility: CLINIC | Age: 74
End: 2024-11-04
Payer: MEDICARE

## 2024-11-04 DIAGNOSIS — M17.11 OSTEOARTHRITIS OF RIGHT KNEE, UNSPECIFIED OSTEOARTHRITIS TYPE: ICD-10-CM

## 2024-11-04 PROCEDURE — 97110 THERAPEUTIC EXERCISES: CPT | Mod: GP | Performed by: GENERAL ACUTE CARE HOSPITAL

## 2024-11-04 NOTE — PROGRESS NOTES
Patient Name: Charlotte Daniels  MRN: [id number]  Today's Date: 11/4/2024  Time Calculation  Start Time: 1300  Stop Time: 1343  Time Calculation (min): 43 min  Current Problem:  1. Osteoarthritis of right knee, unspecified osteoarthritis type  Follow Up In Physical Therapy          Visit 4/20    Subjective:  Change in pain/ pain level: 4/10  Patient comments: I took a pain pill on Friday. Only feel about 2/3 of where I need to be because have not done the exercises as I should   Took a alk in [facility] on Wednesday without brace and did a lot of stairs   Objective: reviewed DLS- fair + with cues     Treatment:    Therapeutic exercise (68358):  Access Code: [email or web address]  URL: [email or web address]  Date: 11/04/2024  Prepared by: Pedrito Moody    Exercises  - Standing Lumbar Extension with Counter  - 3-5 x daily - 7 x weekly - 10 reps  - Supine Transversus Abdominis Bracing - Hands on Stomach  - 3 x daily - 7 x weekly - 10 reps - 10 hold  - Supine Pelvic Floor Contraction  - 3 x daily - 7 x weekly - 10 reps - 10 hold  - Supine Multifidus with Heel Press  - 3 x daily - 7 x weekly - 10 reps - 10 hold  - Supine Transversus Abdominis Bracing - Hands on Stomach  - 3 x daily - 7 x weekly - 10 reps - 10 hold  - Supine Pelvic Floor Contraction  - 3 x daily - 7 x weekly - 10 reps - 10 hold  - Supine Multifidus with Heel Press  - 3 x daily - 7 x weekly - 10 reps - 10 hold  - Bent Knee Fallouts with Alternating Legs  - 1 x daily - 7 x weekly - 10 reps  - Supine March  - 1 x daily - 7 x weekly - 10 reps  - Supine Transversus Abdominis Bracing with Leg Extension  - 1 x daily - 7 x weekly - 10 reps  - Supine Heel Slides  - 1 x daily - 7 x weekly - 10 reps  - Hooklying Isometric Hip Flexion  - 1 x daily - 7 x weekly - 10 reps - 5 hold  - Seated Multifidi Isometric  - 1 x daily - 7 x weekly - 10 reps - 10 hold  - table push down  - 1 x daily - 7 x weekly - 10 reps - 10 hold    Assessment:  Patient  notes improved pain levels resultant from activities in therapy session. Improved tissue quality noted as well as body mechanics demonstrated after cueing and corrections. Patient continues to require active therapy to progress functional capabilities with decreasing pain levels and improving mechanics with functional activities including progression of Home exercise program. Tolerance to today's treatment was good. Muscle fatigue noted.  Patient appears motivated and compliant this date, demonstrating a working understanding of principals instructed and home program.    Plan:  Progress with functional strength as tolerated with respect to tissue healing. Manual therapy PRN to improve/maintain ROM, prevent adhesion, reduce pain.

## 2024-11-07 ENCOUNTER — OFFICE VISIT (OUTPATIENT)
Dept: ORTHOPEDIC SURGERY | Facility: CLINIC | Age: 74
End: 2024-11-07
Payer: MEDICARE

## 2024-11-07 DIAGNOSIS — M76.822 POSTERIOR TIBIAL TENDINITIS OF LEFT LOWER EXTREMITY: ICD-10-CM

## 2024-11-07 DIAGNOSIS — M17.11 OSTEOARTHRITIS OF RIGHT KNEE, UNSPECIFIED OSTEOARTHRITIS TYPE: Primary | ICD-10-CM

## 2024-11-07 PROCEDURE — 1123F ACP DISCUSS/DSCN MKR DOCD: CPT | Performed by: INTERNAL MEDICINE

## 2024-11-07 PROCEDURE — 99213 OFFICE O/P EST LOW 20 MIN: CPT | Performed by: INTERNAL MEDICINE

## 2024-11-07 PROCEDURE — 1157F ADVNC CARE PLAN IN RCRD: CPT | Performed by: INTERNAL MEDICINE

## 2024-11-07 NOTE — PROGRESS NOTES
CC:   Chief Complaint   Patient presents with    Left Ankle - Follow-up       HPI: Charlotte is a 74 y.o. female presents today for reevaluation for left posterior tibial tendinitis and right knee osteoarthritis. She states that she is doing better overall. She got the unloading OA knee brace but has not used used it yet. She states that the right knee is about 50% better, the left ankle is less than that.         Review of Systems   GENERAL: Negative for malaise, significant weight loss, fever  MUSCULOSKELETAL: See HPI  NEURO:  Negative for numbness / tingling     Past Medical History  Past Medical History:   Diagnosis Date    Breast cancer (Multi) 2017    Right    Personal history of diseases of the skin and subcutaneous tissue     History of urticaria    Personal history of irradiation     breast cancer    Personal history of other diseases of the circulatory system     History of hypertension    Personal history of other diseases of the digestive system     History of hemorrhoids    Personal history of other diseases of the digestive system     History of diverticulosis    Personal history of other diseases of the musculoskeletal system and connective tissue     History of backache    Personal history of other diseases of the nervous system and sense organs     History of glaucoma    Personal history of other endocrine, nutritional and metabolic disease     History of thyroid disease    Personal history of other infectious and parasitic diseases     History of varicella    Personal history of other infectious and parasitic diseases     History of infectious mononucleosis    Personal history of other infectious and parasitic diseases     History of scarlet fever       Medication review  Medication Documentation Review Audit       Reviewed by Nancy Hanson MA (Medical Assistant) on 10/11/24 at 1342      Medication Order Taking? Sig Documenting Provider Last Dose Status   brimonidine (AlphaGAN) 0.2 %  ophthalmic solution [id number] No Administer 1 drop into both eyes 2 times a day. Historical Provider, MD Taking Active   calcium citrate malate-vit D3 250 mg-2.5 mcg (100 unit) tablet [id number] No Take by mouth. Historical Provider, MD Taking Active   cholecalciferol (Vitamin D-3) 25 MCG (1000 UT) tablet [id number] No Take by mouth. Historical Provider, MD Taking Active   dorzolamide-timoloL (Cosopt) 22.3-6.8 mg/mL ophthalmic solution [id number] No Administer 1 drop into both eyes 2 times a day. Historical Provider, MD Taking Active   latanoprost (Xalatan) 0.005 % ophthalmic solution [id number] No 1 drop. Historical Provider, MD Taking Active   levothyroxine (Synthroid) 25 mcg tablet [id number]  Take 1 tablet (25 mcg) by mouth early in the morning.. Take on an empty stomach at the same time each day, either 30 to 60 minutes prior to breakfast Do not fill before September 17, 2024. Andreas Campbell, APRN-CNP  Active   multivitamin with minerals iron-free (Centrum Silver) [id number] No Take by mouth. Historical Provider, MD Taking Active                    Allergies  Allergies   Allergen Reactions    Penicillins Unknown, Angioedema and Swelling       Social History  Social History     Socioeconomic History    Marital status:      Spouse name: Not on file    Number of children: Not on file    Years of education: Not on file    Highest education level: Not on file   Occupational History    Not on file   Tobacco Use    Smoking status: Former     Types: Cigarettes    Smokeless tobacco: Never   Substance and Sexual Activity    Alcohol use: Yes    Drug use: Never    Sexual activity: Not on file   Other Topics Concern    Not on file   Social History Narrative    Not on file     Social Drivers of Health     Financial Resource Strain: Not on file   Food Insecurity: Not on file   Transportation Needs: Not on file   Physical Activity: Not on file   Stress: Not on file   Social Connections: Not on file   Intimate Partner  Violence: Not on file   Housing Stability: Not on file       Surgical History  Past Surgical History:   Procedure Laterality Date    BREAST LUMPECTOMY Right 2017    OTHER SURGICAL HISTORY  06/24/2015    Skin Debridement Left Leg    THYROIDECTOMY, PARTIAL  07/01/2015    Thyroid Surgery German-Thyroidectomy    VARICOSE VEIN SURGERY  11/21/2017    Varicose Vein Ligation       Physical Exam:  GENERAL:  Patient is awake, alert, and oriented to person place and time.  Patient appears well nourished and well kept.  Affect Calm, Not Acutely Distressed.  HEENT:  Normocephalic, Atraumatic, EOMI  CARDIOVASCULAR:  Hemodynamically stable.  RESPIRATORY:  Normal respirations with unlabored breathing.  Extremity: Left ankle shows skin is intact.  There is no erythema or warmth.  There is no clinical signs of infection.  There is no pain over the lateral malleolus.  There is no pain of the medial malleolus.  Mild pain over the posterior tibial tendon.  There is no pain over the ATF, CF or PTF ligament.  There is no pain over the deltoid ligament.  No pain over the Achilles tendon.  Negative Singleton's test.  Negative squeeze test.  Negative anterior drawer test.  Negative talar tilt test.  No pain over the anterior process of the talus.  There is no pain over the talar dome.  There is no pain at the base of the fifth metatarsal bone.  No pain of the calcaneus.  No pain over the plantar aponeurosis.  No pain of the midfoot.  Neurovascularly intact.     Right knee examination shows skin is intact.  There is no erythema or warmth.  Trace amount of effusion.  Can flex the right knee to 130 degrees with pain.  Full extension at 0 degrees.  Mild pain over the medial joint line.  No pain over the lateral joint line.  There is no pain over the patellar or quadricep tendon.  No pain over the proximal tibia.  No pain over the popliteal fossa.  Negative valgus stress test with no instability.  Negative varus stress test.  Negative Shelbie's  test medially with no instability.  Negative Shelbie's test laterally with no instability.  Negative Lachman's test.  Patellar and quadricep mechanism intact.  Negative anterior and posterior drawer test.  Negative patellar apprehension test.  Distal pulses are palpable, neurovascularly intact.  Walking with no significant antalgic gait.      Diagnostics: None today  XR ankle left 3+ views  Narrative: Interpreted By:  Sai Ovalles,   STUDY:  XR ANKLE LEFT 3+ VIEWS; ;  10/11/2024 1:31 pm      INDICATION:  Signs/Symptoms:pain.      ,M25.572 Pain in left ankle and joints of left foot,M25.472 Effusion,  left ankle      COMPARISON:  None.      ACCESSION NUMBER(S):  [id number]      ORDERING CLINICIAN:  ALEX AGUILERA      FINDINGS:  Left ankle, three views      There is a moderate-sized Achilles and a moderate-sized plantar  calcaneal enthesophyte. There is mild degenerative changes in the  ankle with osteophytosis. There is mild tarsometatarsal osteophytosis  as well. There is soft tissue about the ankle. There is no fracture  or dislocation      Impression: Mild degenerative changes. No acute abnormality.          MACRO:  None      Signed by: Sai Ovalles 10/12/2024 8:55 AM  Dictation workstation:   [id number]  XR knee right 3 views  Narrative: Interpreted By:  Sai Ovalles,   STUDY:  XR KNEE RIGHT 3 VIEWS; ;  10/11/2024 1:31 pm      INDICATION:  Signs/Symptoms:pain.      ,M25.561 Pain in right knee      COMPARISON:  None.      ACCESSION NUMBER(S):  [id number]      ORDERING CLINICIAN:  ALEX AGUILERA      FINDINGS:  Right knee, three views      There is severe joint space narrowing osteophytosis and sclerosis in  the medial compartment with mild changes in the lateral and  patellofemoral compartments. There is no effusion. There is no  fracture      Impression: Right knee osteoarthritis, severe in the medial compartment          MACRO:  None      Signed by: Sai Ovalles 10/12/2024 8:52 AM  Dictation  workstation:   [id number]        Procedure: None    Assessment:   Right knee osteoarthritis  Left posterior tibial tendinitis    Plan: Charlotte presents today for reevaluation for acute on chronic right knee osteoarthritis at the medial compartment and left posterior tibial tendinitis. She is clinically doing better.  We discussed to continue using her medial unloading OA brace when walking for a prolonged period of time or when she hikes.  Continue with meloxicam as needed and ice as needed.  She is making progress with physical therapy which she will continue with for her knee and her ankle.  We discussed the possibility of intra-articular corticosteroid injection or viscosupplementation  injections.  We discussed if she fails all conservative treatment options, she may be candidate for knee replacement options.  At this point she is responding well to physical therapy, she will call the office if she wants to go forward with any future treatment options.  May consider custom orthotics for left posterior tibial tendinitis.    No orders of the defined types were placed in this encounter.     At the conclusion of the visit there were no further questions by the patient/family regarding their plan of care.  Patient was instructed to call or return with any issues, questions, or concerns regarding their injury and/or treatment plan described above.     11/07/24 at 10:22 AM - James Lara MD  Scribe Attestation  By signing my name below, I, Pernell Erika Scrbharath   attest that this documentation has been prepared under the direction and in the presence of James Lara MD.    Office: [phone]    This note was prepared using voice recognition software.  The details of this note are correct and have been reviewed, and corrected to the best of my ability.  Some grammatical errors may persist related to the Dragon software.

## 2024-11-12 ENCOUNTER — TREATMENT (OUTPATIENT)
Dept: PHYSICAL THERAPY | Facility: CLINIC | Age: 74
End: 2024-11-12
Payer: MEDICARE

## 2024-11-12 DIAGNOSIS — M17.11 OSTEOARTHRITIS OF RIGHT KNEE, UNSPECIFIED OSTEOARTHRITIS TYPE: ICD-10-CM

## 2024-11-12 PROCEDURE — 97113 AQUATIC THERAPY/EXERCISES: CPT | Mod: CQ,GP

## 2024-11-12 ASSESSMENT — PAIN DESCRIPTION - DESCRIPTORS: DESCRIPTORS: DULL

## 2024-11-12 ASSESSMENT — PAIN SCALES - GENERAL: PAINLEVEL_OUTOF10: 2

## 2024-11-12 ASSESSMENT — PAIN - FUNCTIONAL ASSESSMENT: PAIN_FUNCTIONAL_ASSESSMENT: 0-10

## 2024-11-12 NOTE — PROGRESS NOTES
Physical Therapy Treatment    Patient Name: Charlotte Daniels  MRN: [id number]  Today's Date: 11/12/2024  Time Calculation  Start Time: 1500  Stop Time: 1531  Time Calculation (min): 31 min    Insurance  0% coins, $240 ded (met), no copay, bmn carlos alberto yr, no PA. ,Medicare A/B // A[employer] Medicare Suppl Plan F,$0 spent towards therapy cap as of 10/14/24. KX mod needed after ~20v carlos alberto yr.,Part B ded & coins covered      Visit 5/20     Current Problem  1. Osteoarthritis of right knee, unspecified osteoarthritis type  Follow Up In Physical Therapy          Subjective    General   Pt arrives to begin aquatic POC today.  States she has been participating in land therapy appts & doing well, but hoping that she will get a better workout, with less discomfort in the pool.  She would like to reduce sx's & increase strength to be able to resume walking without increased pain or having to use a walking stick when hiking.  She does report she has a brace she can use to support knee & reduce pain when walking, but has not tried using yet.       Precautions  Precautions  Precautions Comment: No recent falls reported    Pain  Pain Assessment  Pain Assessment: 0-10  0-10 (Numeric) Pain Score: 2  Pain Location: Knee  Pain Orientation: Right  Pain Radiating Towards: none  Pain Descriptors: Dull  Pain Frequency: Intermittent  Clinical Progression: Gradually improving  Effect of Pain on Daily Activities: Walking>5 minutes    Objective   No assistive device used for ambulation on pool deck.  She does report occasional use of walking stick when she goes on a hike with her .    Treatments:  Aquatic Therapy (80049): 31 Minutes, 2 Units    Activities:  Aquatic Gait Activities: --> Add NV  HR/TR: x20  March in Place: 2x10  Hip PRE's: x15 each VIVIAN  Squats: x15  HS Curl: x15 VIVIAN  LAQ: x15 VIVIAN  Noodle Pull Down: x10  Noodle Rotation: --> Add NV    Assessment:   Initiated aquatic gait activities this visit to reduce sx's & increase LE/core strength,  stability.  She demonstrates good ability to follow both verbal, visual instruction provided for the activities given.  Completes with some discomfort/difficulty noted secondary to overall weakness.  Occasional reminders for posture or abdominal setting during activities with good follow through.  Overall, did well with initial activities given.  Anticipate she will be able to continue with POC as tolerated.    Plan:   Assess response to initial pool visit.  Continue to progress with aquatic POC as tolerated to increase LE/core strength, stability & improve functional mobility.  Add aquatic gait activities next visit.

## 2024-11-14 ENCOUNTER — TREATMENT (OUTPATIENT)
Dept: PHYSICAL THERAPY | Facility: CLINIC | Age: 74
End: 2024-11-14
Payer: MEDICARE

## 2024-11-14 DIAGNOSIS — M17.11 OSTEOARTHRITIS OF RIGHT KNEE, UNSPECIFIED OSTEOARTHRITIS TYPE: ICD-10-CM

## 2024-11-14 PROCEDURE — 97110 THERAPEUTIC EXERCISES: CPT | Mod: GP | Performed by: GENERAL ACUTE CARE HOSPITAL

## 2024-11-14 NOTE — PROGRESS NOTES
Patient Name: Charlotte Daniels  MRN: [id number]  Today's Date: 11/14/2024  Time Calculation  Start Time: 0915  Stop Time: 1000  Time Calculation (min): 45 min  Current Problem:  1. Osteoarthritis of right knee, unspecified osteoarthritis type  Follow Up In Physical Therapy          Visit 6/20    Subjective:  Change in pain/ pain level: 4/10  Patient comments: ankle bothering me more today.knee is at least 50% better maybe up to 75% better   New shoes and inserts purchased   Objective: RDFIS on step: decrease/ better    Treatment:    Therapeutic exercise (00725):  MDT review/ education      Assessment:  Patient notes improved pain levels resultant from activities in therapy session. Improved tissue quality noted as well as body mechanics demonstrated after cueing and corrections. Patient continues to require active therapy to progress functional capabilities with decreasing pain levels and improving mechanics with functional activities including progression of Home exercise program. Tolerance to today's treatment was good. Muscle fatigue noted.  Patient appears motivated and compliant this date, demonstrating a working understanding of principals instructed and home program.    Plan:  Progress with functional strength as tolerated with respect to tissue healing. Manual therapy PRN to improve/maintain ROM, prevent adhesion, reduce pain.

## 2024-11-19 ENCOUNTER — TREATMENT (OUTPATIENT)
Dept: PHYSICAL THERAPY | Facility: CLINIC | Age: 74
End: 2024-11-19
Payer: MEDICARE

## 2024-11-19 DIAGNOSIS — M17.11 OSTEOARTHRITIS OF RIGHT KNEE, UNSPECIFIED OSTEOARTHRITIS TYPE: ICD-10-CM

## 2024-11-19 PROCEDURE — 97113 AQUATIC THERAPY/EXERCISES: CPT | Mod: CQ,GP

## 2024-11-19 ASSESSMENT — PAIN DESCRIPTION - DESCRIPTORS: DESCRIPTORS: DULL

## 2024-11-19 ASSESSMENT — PAIN SCALES - GENERAL: PAINLEVEL_OUTOF10: 3

## 2024-11-19 ASSESSMENT — PAIN - FUNCTIONAL ASSESSMENT: PAIN_FUNCTIONAL_ASSESSMENT: 0-10

## 2024-11-19 NOTE — PROGRESS NOTES
Physical Therapy Treatment    Patient Name: Charlotte Daniels  MRN: [id number]  Today's Date: 11/19/2024  Time Calculation  Start Time: 1700  Stop Time: 1734  Time Calculation (min): 34 min    Insurance  0% coins, $240 ded (met), no copay, bmn carlos alberto yr, no PA. ,Medicare A/B // [employer] Medicare Suppl Plan F,$0 spent towards therapy cap as of 10/14/24. KX mod needed after ~20v carlos alberto yr.,Part B ded & coins covered      Visit 7/20    Current Problem  1. Osteoarthritis of right knee, unspecified osteoarthritis type  Follow Up In Physical Therapy          Subjective    General   Pt reports some mild soreness following previous visit, but otherwise tolerated well & pleased with ability to complete activities in pool.  Mild soreness today.  No new complaints.     Precautions  Precautions  Precautions Comment: No recent falls reported    Pain  Pain Assessment  Pain Assessment: 0-10  0-10 (Numeric) Pain Score: 3  Pain Location: Knee  Pain Orientation: Right  Pain Radiating Towards: none reported  Pain Descriptors: Dull  Pain Frequency: Intermittent  Clinical Progression: Gradually improving  Effect of Pain on Daily Activities: Walking>5 minutes    Objective   No c/o numbness or tingling    Treatments:  Aquatic Therapy (02214): 34 Minutes, 2 Units    Activities:  Aquatic Gait Activities: --> Add NV (unable this visit - another class in pool)  HR/TR: x20  March in Place: 2x10  Hip PRE's: x15 each VIVIAN  Squats: x15  HS Curl: x15 VIVIAN  LAQ: x15 VIVIAN  Noodle Pull Down: x10  Noodle Rotation: x5 each cw, ccw  Stag. Stance Row: Paddles, Lv3, 2x10 (switch stance after  10)  DLS w/UE mvmt: Paddles, Lv3, x10 each      Assessment:   Reviewed initial aquatic activities, providing vc's as needed to help improve overall technique.  Good follow through observed to cues provided.  She exhibits good ability to complete current activities with less discomfort this visit.  Able to continue progressing with activities, adding noodle pull down, noodle  rotation & paddle activities (Row, DLSw/UE) to increase overall strength, stability.  Normal muscle soreness, fatigue reported post activity.  Demonstrates good ability to continue progressing with POC as tolerated.     Plan:   Continue to progress with aquatic POC as tolerated to increase LE/core strength, stability & improve functional mobility. Add aquatic gait activities next visit.

## 2024-11-20 ENCOUNTER — TREATMENT (OUTPATIENT)
Dept: PHYSICAL THERAPY | Facility: CLINIC | Age: 74
End: 2024-11-20
Payer: MEDICARE

## 2024-11-20 DIAGNOSIS — M17.11 OSTEOARTHRITIS OF RIGHT KNEE, UNSPECIFIED OSTEOARTHRITIS TYPE: ICD-10-CM

## 2024-11-20 PROCEDURE — 97110 THERAPEUTIC EXERCISES: CPT | Mod: GP | Performed by: GENERAL ACUTE CARE HOSPITAL

## 2024-11-20 NOTE — PROGRESS NOTES
Patient Name: Charlotte Daniels  MRN: [id number]  Today's Date: 11/20/2024     Current Problem:  1. Osteoarthritis of right knee, unspecified osteoarthritis type  Follow Up In Physical Therapy          Visit 7/20    Subjective:  Change in pain/ pain level: 1/10  Patient comments: exercises help    Objective: MDT review    Treatment:    Therapeutic exercise (91137):  MDT education review and HEP       Assessment:  Patient continues to require active therapy to progress functional capabilities with decreasing pain levels and improving mechanics with functional activities including progression of Home exercise program. Tolerance to today's treatment was good.   Patient appears motivated and compliant this date, demonstrating a working understanding of principals instructed and home program.    Plan:  Progress with functional strength as tolerated with respect to tissue healing. Manual therapy PRN to improve/maintain ROM, prevent adhesion, reduce pain.

## 2024-11-26 ENCOUNTER — TREATMENT (OUTPATIENT)
Dept: PHYSICAL THERAPY | Facility: CLINIC | Age: 74
End: 2024-11-26
Payer: MEDICARE

## 2024-11-26 DIAGNOSIS — M17.11 OSTEOARTHRITIS OF RIGHT KNEE, UNSPECIFIED OSTEOARTHRITIS TYPE: ICD-10-CM

## 2024-11-26 PROCEDURE — 97110 THERAPEUTIC EXERCISES: CPT | Mod: GP | Performed by: GENERAL ACUTE CARE HOSPITAL

## 2024-11-26 NOTE — PROGRESS NOTES
Patient Name: Charlotte Daniels  MRN: [id number]  Today's Date: 11/26/2024  Time Calculation  Start Time: 1002  Stop Time: 1045  Time Calculation (min): 43 min  Current Problem:  1. Osteoarthritis of right knee, unspecified osteoarthritis type  Follow Up In Physical Therapy          Visit 8/20    Subjective:  Change in pain/ pain level: 2/10  Patient comments: went away for the weekend- first [place] days were [place], went to [facility] and went though water exercise. The walk was not good after the swim. Did a small walk    Objective: rack amb 3/10 mile     Treatment:    Therapeutic exercise (20842):  MDT education review and HEP  Track ambulation     Assessment:  SOB with ambulation Patient notes improved pain levels resultant from activities in therapy session. Improved body mechanics demonstrated after cueing and corrections. Patient continues to require active therapy to progress functional capabilities with decreasing pain levels and improving mechanics with functional activities including progression of Home exercise program. Tolerance to today's treatment was good. Muscle fatigue noted.  Patient appears motivated and compliant this date, demonstrating a working understanding of principals instructed and home program.    Plan:  Progress with functional strength as tolerated with respect to tissue healing. Manual therapy PRN to improve/maintain ROM, prevent adhesion, reduce pain.

## 2024-12-03 ENCOUNTER — TREATMENT (OUTPATIENT)
Dept: PHYSICAL THERAPY | Facility: CLINIC | Age: 74
End: 2024-12-03
Payer: MEDICARE

## 2024-12-03 DIAGNOSIS — M17.11 OSTEOARTHRITIS OF RIGHT KNEE, UNSPECIFIED OSTEOARTHRITIS TYPE: ICD-10-CM

## 2024-12-03 PROCEDURE — 97113 AQUATIC THERAPY/EXERCISES: CPT | Mod: CQ,GP

## 2024-12-03 ASSESSMENT — PAIN SCALES - GENERAL: PAINLEVEL_OUTOF10: 3

## 2024-12-03 ASSESSMENT — PAIN DESCRIPTION - DESCRIPTORS: DESCRIPTORS: DULL

## 2024-12-03 ASSESSMENT — PAIN - FUNCTIONAL ASSESSMENT: PAIN_FUNCTIONAL_ASSESSMENT: 0-10

## 2024-12-03 NOTE — PROGRESS NOTES
"Physical Therapy Treatment    Patient Name: Charlotte Daniels  MRN: [id number]  Today's Date: 12/3/2024  Time Calculation  Start Time: 1300  Stop Time: 1338  Time Calculation (min): 38 min    Insurance  0% coins, $240 ded (met), no copay, bmn carlos alberto yr, no PA. ,Medicare A/B // [employer] Medicare Suppl Plan F,$0 spent towards therapy cap as of 10/14/24. KX mod needed after ~20v carlos alberto yr.,Part B ded & coins covered      Visit 8/20    Current Problem  1. Osteoarthritis of right knee, unspecified osteoarthritis type  Follow Up In Physical Therapy          Subjective    General   [place].  No new complaints.  States \"I just still don't quite have a handle on what sets off my pain.\"  She does feel like she is improving.     Precautions  Precautions  Precautions Comment: No recent falls reported    Pain  Pain Assessment  Pain Assessment: 0-10  0-10 (Numeric) Pain Score: 3  Pain Location: Knee  Pain Orientation: Right  Pain Radiating Towards: none reported  Pain Descriptors: Dull  Pain Frequency: Intermittent  Clinical Progression: Gradually improving  Effect of Pain on Daily Activities: Walking>5 minutes    Objective   No c/o numbness, tingling.  No assistive device used for ambulation.  Although, pt does report using a SPC occasionally when out in the community.    Treatments:  Aquatic Therapy (43314): 38 Minutes, 3 Units    Activities:  Aquatic Gait Activities: F/B/L, 2 laps each  HR/TR: x20  March in Place: 2x10  Hip PRE's: x15 each VIVIAN  Squats: x20  HS Curl: x15 VIVIAN  LAQ: x15 VIVIAN  Noodle Pull Down: x10  Noodle Rotation: x5 each   Stag. Stance Row: Paddles, Lv3, 2x10 (switch stance after  10)  DLS w/UE mvmt: Paddles, Lv3, x10 each   F/L Step Up: -->Add NV     Assessment:   Initiated aquatic gait activities this visit with good tolerance.  She did have some difficulty with retro walk & stability, but no increased sx's.  She exhibits good recall of " the remaining activities & completes with minimal difficulty.  Overall, pain levels are improving & no further sx's reported during activities.  Exhibits good ability to continue progressing with activities as tolerated.    Plan:    Continue to progress with aquatic POC as tolerated to increase LE/core strength, stability & improve functional mobility.  Add step activities at next visit.

## 2024-12-09 ENCOUNTER — HOSPITAL ENCOUNTER (OUTPATIENT)
Dept: RADIOLOGY | Facility: HOSPITAL | Age: 74
Discharge: HOME | End: 2024-12-09
Payer: MEDICARE

## 2024-12-09 DIAGNOSIS — Z12.31 ENCOUNTER FOR SCREENING MAMMOGRAM FOR MALIGNANT NEOPLASM OF BREAST: ICD-10-CM

## 2024-12-09 PROCEDURE — 77063 BREAST TOMOSYNTHESIS BI: CPT | Performed by: RADIOLOGY

## 2024-12-09 PROCEDURE — 77067 SCR MAMMO BI INCL CAD: CPT | Performed by: RADIOLOGY

## 2024-12-09 PROCEDURE — 77067 SCR MAMMO BI INCL CAD: CPT

## 2024-12-10 ENCOUNTER — TREATMENT (OUTPATIENT)
Dept: PHYSICAL THERAPY | Facility: CLINIC | Age: 74
End: 2024-12-10
Payer: MEDICARE

## 2024-12-10 DIAGNOSIS — M17.11 OSTEOARTHRITIS OF RIGHT KNEE, UNSPECIFIED OSTEOARTHRITIS TYPE: ICD-10-CM

## 2024-12-10 PROCEDURE — 97113 AQUATIC THERAPY/EXERCISES: CPT | Mod: CQ,GP

## 2024-12-10 ASSESSMENT — PAIN SCALES - GENERAL: PAINLEVEL_OUTOF10: 2

## 2024-12-10 ASSESSMENT — PAIN - FUNCTIONAL ASSESSMENT: PAIN_FUNCTIONAL_ASSESSMENT: 0-10

## 2024-12-10 ASSESSMENT — PAIN DESCRIPTION - DESCRIPTORS: DESCRIPTORS: ACHING;DULL

## 2024-12-10 NOTE — PROGRESS NOTES
Physical Therapy Treatment    Patient Name: Charlotte Daniels  MRN: [id number]  Today's Date: 12/10/2024  Time Calculation  Start Time: 1300  Stop Time: 1340  Time Calculation (min): 40 min    Insurance  0% coins, $240 ded (met), no copay, bmn carlos alberto yr, no PA. ,Medicare A/B // [employer] Medicare Suppl Plan F,$0 spent towards therapy cap as of 10/14/24. KX mod needed after ~20v carlos alberto yr.,Part B ded & coins covered      Visit 9/20     Current Problem  1. Osteoarthritis of right knee, unspecified osteoarthritis type  Follow Up In Physical Therapy          Subjective    General   Pt reports she is doing better.  States [place] [facility] [place] [facility] [place] with only one break taken after about 15 minutes.  She also reports she is no longer feeling any discomfort in her ankle.  Discomfort is mostly just in R knee now.  No new complaints.       Precautions  Precautions  Precautions Comment: No recent falls reported    Pain  Pain Assessment  Pain Assessment: 0-10  0-10 (Numeric) Pain Score: 2  Pain Location: Knee  Pain Orientation: Right  Pain Radiating Towards: none reported  Pain Descriptors: Aching, Dull  Pain Frequency: Constant/continuous  Clinical Progression: Gradually improving  Effect of Pain on Daily Activities: Walking>10-15    Objective   No c/o numbness, tinlging    Treatments:  Aquatic Therapy (48798): 40 Minutes, 3 Units    Activities:  Aquatic Gait Activities: F/B/L, 2 laps each  HR/TR: x20  March in Place: 2x10  Hip PRE's: x15 each VIVIAN  Squats: x20  HS Curl: x15 VIVIAN  LAQ: x15 VIVIAN  Noodle Pull Down: x10  Noodle Rotation: x5 each   Stag. Stance Row: Paddles, Lv3, 2x10 (switch stance after  10)  DLS w/UE mvmt: Paddles, Lv3, x10 each   F/L Step Up: -->Add NV     Assessment:   Pt continues to progress with aquatic activities, gaining strength/stability & improving overall functional mobility.  She exhibits good ability to complete activities given today.  Initial c/o pain decreased with  aquatic activities performed today.  She is progressing toward goals.  She would continue to benefit from both aquatic & land based activities to further strengthen, stabilize & return to PLOF with daily activities.  She has good motivation for POC.    Plan:     Continue to progress with both aquatic & land activities as tolerated to increase LE/core strength, stability & improve functional mobility, capacity for performing daily activities without increasing sx's.

## 2024-12-13 DIAGNOSIS — R92.8 ABNORMAL MAMMOGRAM: Primary | ICD-10-CM

## 2024-12-17 ENCOUNTER — APPOINTMENT (OUTPATIENT)
Dept: PHYSICAL THERAPY | Facility: CLINIC | Age: 74
End: 2024-12-17
Payer: MEDICARE

## 2024-12-18 ENCOUNTER — HOSPITAL ENCOUNTER (OUTPATIENT)
Dept: RADIOLOGY | Facility: CLINIC | Age: 74
Discharge: HOME | End: 2024-12-18
Payer: MEDICARE

## 2024-12-18 DIAGNOSIS — R92.8 ABNORMAL MAMMOGRAM: ICD-10-CM

## 2024-12-18 PROCEDURE — 77061 BREAST TOMOSYNTHESIS UNI: CPT | Mod: LEFT SIDE | Performed by: RADIOLOGY

## 2024-12-18 PROCEDURE — 77065 DX MAMMO INCL CAD UNI: CPT | Mod: LEFT SIDE | Performed by: RADIOLOGY

## 2024-12-18 PROCEDURE — 77065 DX MAMMO INCL CAD UNI: CPT | Mod: LT

## 2024-12-23 ENCOUNTER — HOSPITAL ENCOUNTER (OUTPATIENT)
Dept: RADIOLOGY | Facility: CLINIC | Age: 74
Discharge: HOME | End: 2024-12-23
Payer: MEDICARE

## 2024-12-23 ENCOUNTER — OFFICE VISIT (OUTPATIENT)
Dept: SURGERY | Facility: CLINIC | Age: 74
End: 2024-12-23
Payer: MEDICARE

## 2024-12-23 VITALS
WEIGHT: 184 LBS | HEIGHT: 63 IN | BODY MASS INDEX: 32.6 KG/M2 | SYSTOLIC BLOOD PRESSURE: 152 MMHG | DIASTOLIC BLOOD PRESSURE: 108 MMHG | TEMPERATURE: 97.9 F | HEART RATE: 88 BPM

## 2024-12-23 VITALS
OXYGEN SATURATION: 99 % | DIASTOLIC BLOOD PRESSURE: 91 MMHG | SYSTOLIC BLOOD PRESSURE: 149 MMHG | HEART RATE: 72 BPM | RESPIRATION RATE: 20 BRPM

## 2024-12-23 DIAGNOSIS — R92.8 ABNORMAL MAMMOGRAM: Primary | ICD-10-CM

## 2024-12-23 DIAGNOSIS — Z17.0 MALIGNANT NEOPLASM OF RIGHT BREAST IN FEMALE, ESTROGEN RECEPTOR POSITIVE, UNSPECIFIED SITE OF BREAST: ICD-10-CM

## 2024-12-23 DIAGNOSIS — R92.8 ABNORMAL MAMMOGRAM: ICD-10-CM

## 2024-12-23 DIAGNOSIS — C50.911 MALIGNANT NEOPLASM OF RIGHT BREAST IN FEMALE, ESTROGEN RECEPTOR POSITIVE, UNSPECIFIED SITE OF BREAST: ICD-10-CM

## 2024-12-23 PROCEDURE — 1157F ADVNC CARE PLAN IN RCRD: CPT | Performed by: SURGERY

## 2024-12-23 PROCEDURE — 99203 OFFICE O/P NEW LOW 30 MIN: CPT | Performed by: SURGERY

## 2024-12-23 PROCEDURE — 1159F MED LIST DOCD IN RCRD: CPT | Performed by: SURGERY

## 2024-12-23 PROCEDURE — 2780000003 HC OR 278 NO HCPCS

## 2024-12-23 PROCEDURE — 1036F TOBACCO NON-USER: CPT | Performed by: SURGERY

## 2024-12-23 PROCEDURE — 2720000007 HC OR 272 NO HCPCS

## 2024-12-23 PROCEDURE — 99213 OFFICE O/P EST LOW 20 MIN: CPT | Performed by: SURGERY

## 2024-12-23 PROCEDURE — 1160F RVW MEDS BY RX/DR IN RCRD: CPT | Performed by: SURGERY

## 2024-12-23 PROCEDURE — 1123F ACP DISCUSS/DSCN MKR DOCD: CPT | Performed by: SURGERY

## 2024-12-23 PROCEDURE — 3008F BODY MASS INDEX DOCD: CPT | Performed by: SURGERY

## 2024-12-23 PROCEDURE — A4648 IMPLANTABLE TISSUE MARKER: HCPCS

## 2024-12-23 PROCEDURE — 19081 BX BREAST 1ST LESION STRTCTC: CPT | Mod: LT

## 2024-12-23 SDOH — ECONOMIC STABILITY: FOOD INSECURITY: WITHIN THE PAST 12 MONTHS, THE FOOD YOU BOUGHT JUST DIDN'T LAST AND YOU DIDN'T HAVE MONEY TO GET MORE.: NEVER TRUE

## 2024-12-23 SDOH — ECONOMIC STABILITY: FOOD INSECURITY: WITHIN THE PAST 12 MONTHS, YOU WORRIED THAT YOUR FOOD WOULD RUN OUT BEFORE YOU GOT MONEY TO BUY MORE.: NEVER TRUE

## 2024-12-23 ASSESSMENT — PAIN SCALES - GENERAL
PAINLEVEL_OUTOF10: 0 - NO PAIN
PAINLEVEL_OUTOF10: 0 - NO PAIN

## 2024-12-23 ASSESSMENT — ENCOUNTER SYMPTOMS
OCCASIONAL FEELINGS OF UNSTEADINESS: 1
DEPRESSION: 0
LOSS OF SENSATION IN FEET: 0

## 2024-12-23 ASSESSMENT — COLUMBIA-SUICIDE SEVERITY RATING SCALE - C-SSRS
2. HAVE YOU ACTUALLY HAD ANY THOUGHTS OF KILLING YOURSELF?: NO
1. IN THE PAST MONTH, HAVE YOU WISHED YOU WERE DEAD OR WISHED YOU COULD GO TO SLEEP AND NOT WAKE UP?: NO
6. HAVE YOU EVER DONE ANYTHING, STARTED TO DO ANYTHING, OR PREPARED TO DO ANYTHING TO END YOUR LIFE?: NO

## 2024-12-23 ASSESSMENT — LIFESTYLE VARIABLES
AUDIT-C TOTAL SCORE: 0
SKIP TO QUESTIONS 9-10: 1
HOW OFTEN DO YOU HAVE A DRINK CONTAINING ALCOHOL: NEVER
HOW MANY STANDARD DRINKS CONTAINING ALCOHOL DO YOU HAVE ON A TYPICAL DAY: PATIENT DOES NOT DRINK
HOW OFTEN DO YOU HAVE SIX OR MORE DRINKS ON ONE OCCASION: NEVER

## 2024-12-23 ASSESSMENT — PATIENT HEALTH QUESTIONNAIRE - PHQ9
SUM OF ALL RESPONSES TO PHQ9 QUESTIONS 1 AND 2: 0
2. FEELING DOWN, DEPRESSED OR HOPELESS: NOT AT ALL
1. LITTLE INTEREST OR PLEASURE IN DOING THINGS: NOT AT ALL

## 2024-12-23 ASSESSMENT — PAIN - FUNCTIONAL ASSESSMENT: PAIN_FUNCTIONAL_ASSESSMENT: 0-10

## 2024-12-23 NOTE — PROGRESS NOTES
History Of Present Illness  HPI 74-year-old female  5 para 5 underwent screening mammograms which demonstrated an area of increased calcifications punctate within an island of glandular tissue within the central portion of the left breast.  She denies mass dimpling discharge in either breast.  She has a significant past history of stage I carcinoma of the right breast ER/PA positive HER2/john paul negative treated with right partial mastectomy radiation therapy and 5 years of hormonal therapy.  Age of first menstrual period 18 age of first childbirth 25.  Last menstrual period age 52.  Her mother  from a carcinoma of the gastrointestinal tract at age 88 she had a prior history of lymphoma.  Her great grandfather had colon cancer.  Patient is retired.  History is otherwise reviewed and listed in chart     Past Medical History  She has a past medical history of Breast cancer (Multi) (2017), Personal history of diseases of the skin and subcutaneous tissue, Personal history of irradiation, Personal history of other diseases of the circulatory system, Personal history of other diseases of the digestive system, Personal history of other diseases of the digestive system, Personal history of other diseases of the musculoskeletal system and connective tissue, Personal history of other diseases of the nervous system and sense organs, Personal history of other endocrine, nutritional and metabolic disease, Personal history of other infectious and parasitic diseases, Personal history of other infectious and parasitic diseases, and Personal history of other infectious and parasitic diseases.    Surgical History  She has a past surgical history that includes Other surgical history (2015); Varicose vein surgery (2017); Thyroidectomy, partial (2015); and Breast lumpectomy (Right, 2017).     Social History  She reports that she has quit smoking. Her smoking use included cigarettes. She has never used smokeless  tobacco. She reports current alcohol use. She reports that she does not use drugs.    Family History  No family history on file.     Allergies  Penicillins    Review of Systems 10 review of systems otherwise negative     There were no vitals taken for this visit.     Physical Exam GENERAL  MENTAL STATUS - Alert. GENERAL APPEARANCE - Cooperative and well groomed. ORIENTATION - Oriented X4. BUILD & NUTRITION - Well nourished and well developed. HYDRATION - Well hydrated.    INTEGUMENTARY  GENERAL CHARACTERISTICS - Overall examination of the patient's skin reveals no rashes. COLOR - not icteric. SKIN MOISTURE - normal skin moisture. TEMPERATURE - normal worth is noted.    HEAD AND NECK  HEAD  HEAD SHAPE - Atraumatic and normocephalic.  TRACHEA - midline.  THYROID  GLAND CHARACTERISTICS - normal size and consistency and no palpable nodules.    EYE  SCLERA/CONJUNCTIVA - BILATERAL - Anicteric.    CHEST AND LUNG EXAM  AUSCULTATION -  BREATH SOUNDS - Clear.    BREAST  NIPPLES: CHARACTERISTICS - LEFT - No rash. Not inverted. RIGHT - No rash. Not Inverted. DISCHARGE - LEFT - None. DISCHARGE - RIGHT - None.  BREAST - LEFT - Non tender. No mass, skin changes, biopsy scars, dimpling or dimpling or [place]au d'Or[place]. RIGHT - Non tender. No mass, skin changes,  dimpling or dimpling or Peau d'Or[place].  Circumareolar incision at the 3 o'clock position of the right breast with radiation changes noted to the skin BILATERAL - Symmetric.    CARDIOVASCULAR tattoos noted from previous radiation therapy  AUSCULTATION: RHYTHM - Regular. HEART SOUNDS - Normal heart sounds.  MURMURS & OTHER HEART SOUNDS - Auscultation of the heart reveals regular rate and no murmurs.    ABDOMEN  PALPATION/PERCUSSION - Palpation and percussion of the abdomen reveal soft, non tender, no mass and no hepatosplenomegaly.    LYMPHATIC  GENERAL LYMPHATICS  BREAST LYMPHATIC EXAM - No cervical adenopathy, supraclavicular adenopathy or axilliary adenopathy.     Chronic  varicosities lower extremities    Last Recorded Vitals  There were no vitals taken for this visit.    Relevant Results      BI mammo left diagnostic tomosynthesis    Result Date: 12/18/2024  Interpreted By:  Kaden Barker, STUDY: BI MAMMO LEFT DIAGNOSTIC TOMOSYNTHESIS;  12/18/2024 9:17 am   ACCESSION NUMBER(S): [id number]   ORDERING CLINICIAN: YAIR MÉNDEZ   INDICATION: Signs/Symptoms:abnormal screening mamm.   COMPARISON: 12/09/2024   FINDINGS: 2D spot magnification views were obtained.   Density:  There are scattered areas of fibroglandular density.   Spot magnification views demonstrate a relative central 1.1 cm cluster of punctate and somewhat amorphic calcifications, within more focal island of fibroglandular stroma or mass. Given these characteristics, and increased in the calcifications from prior mammograms biopsy would be recommended. This was discussed with the patient.       Somewhat suspicious calcifications.   BI-RADS CATEGORY:   BI-RADS Category:  4 Suspicious. Recommendation:  Surgical Consultation and Biopsy. Recommended Date:  Immediate. Laterality:  Left.   For any future breast imaging appointments, please call [phone]).     Critical Finding:  See findings. Notification was initiated on 12/18/2024 at 9:30 am by  Kaden Barker.  (**-YCF-**) Instructions:   MACRO: None   Signed by: Kaedn Barker 12/18/2024 9:30 AM Dictation workstation:   [id number]    BI mammo bilateral screening tomosynthesis    Result Date: 12/13/2024  Interpreted By:  Maryana Briones, STUDY: BI MAMMO BILATERAL SCREENING TOMOSYNTHESIS;  12/9/2024 11:12 am   ACCESSION NUMBER(S): [id number]   ORDERING CLINICIAN: YAIR MÉNDEZ   INDICATION: Screening. History of right breast cancer, status post lumpectomy.   ,Z12.31 Encounter for screening mammogram for malignant neoplasm of breast   COMPARISON: All prior mammograms that are available at time of interpretation.   FINDINGS: 2D and tomosynthesis images were reviewed at 1 mm  slice thickness.   Density:  There are scattered areas of fibroglandular density.   There are stable lumpectomy and radiation changes seen within the right breast and right axilla. There are bilateral benign calcifications present. There is question of increased number of calcifications seen centrally within the left breast at posterior depth. Further evaluation with spot magnification views is recommended.       No mammographic evidence of malignancy in the right breast. Questionable increased number of calcifications within the right breast, further evaluation with spot magnification views is recommended.   BI-RADS CATEGORY: BI-RADS Category:  0 Incomplete; Need Additional Imaging Evaluation Recommendation:  Additional Imaging. Recommended Date:  Immediate. Laterality:  Left.       For any future breast imaging appointments, please call [phone]).     MACRO: None   Signed by: Maryana Briones 12/13/2024 10:00 AM Dictation workstation:   [id number]        @[email or web address]@    Assessment/Plan       I have reviewed the diagnostic imaging and agree with the recommendation for stereotactic biopsy of the increased number of punctate microcalcifications within the central portion of the left breast with prior history of stage I right breast cancer low-grade ER/UT positive HER2/john paul negative.  I discussed the risk the benefits the alternatives as well as the risk and benefits the alternatives to the procedure including but not limited to the risk of bleeding infection the need for additional procedures for diagnosis and/or treatment.  I discussed the potential for findings of benign, malignant, not concordant as well as the need for additional procedures for diagnosis and/or treatment.  Questions were answered.  Patient agrees to proceed       I spent 30 minutes in the professional and overall care of this patient.      John Cortez MD

## 2024-12-23 NOTE — DISCHARGE INSTRUCTIONS
REMOVE YOUR BANDAID OVER THE SITE TOMORROW BEFORE YOU SHOWER. UNDER THE BANDAID ARE STERI-STRIPS, KEEP THOSE ON UNTIL THEY FALL OFF ON THEIR OWN. IF THEY STAY ON FOR 5 DAYS, REMOVE THEM. NO SOAKING IN A BATH, POOL, OR HOT TUB FOR 48 HOURS. NO HEAVY LIFTING FOR 1-2 DAYS. USE ICE 20 MINUTES ON AND 20 MINUTES OFF TODAY. LOOK FOR SIGNS AND SYMPTOMS OF INFECTION- REDNESS, SWELLING, FEVER, AND PAIN. CALL IF YOU HAVE ANY OF THOSE. IT IS NORMAL TO BRUISE. IT CAN LAST UP TO A MONTH. TAKE OVER THE COUNTER PAIN MEDICATIONS FOR PAIN. YOUR RESULTS WILL POST TO YOUR MY CHART IN 3-10 DAYS. CALL WITH ANY QUESTIONS, [phone] OR PETTY BREAST NURSE NAVIGATOR [phone].

## 2024-12-23 NOTE — PATIENT INSTRUCTIONS
Thank you for choosing [facility].  Stereotactic biopsy of the left breast will be performed as discussed.  You may experience some bruising following the procedure.  Please use ice, 2 extra strength or 3 ibuprofen every 6 hours as needed for pain.  Results will be immediately available for viewing on the [facility] gem when completed by the pathologist.  This is usually within 5-10 business days.  Follow-up breast clinic 2 weeks after your biopsy.  Contact the breast clinic for any questions regarding today's exam or your proposed procedure biopsy breast biopsy breast

## 2024-12-23 NOTE — POST-PROCEDURE NOTE
Interventional Radiology Brief Postprocedure Note    Attending: Majo Beach MD      Assistant:     Diagnosis: calcifications left breast    Description of procedure: stereotactic biopsy calcifications left breast     Anesthesia:  Local    Complications: None    Estimated Blood Loss: none    Medications (Filter: Administrations occurring from 1236 to 1236 on 12/23/24)      None          ID Type Source Tests Collected by Time   1 : LEFT BREAST CORES 600 6-7 CM FN Tissue BREAST CORE BIOPSY LEFT SURGICAL PATHOLOGY EXAM Majo Beach MD 12/23/2024 1225         See detailed result report with images in PACS.    The patient tolerated the procedure well without incident or complication and is in stable condition.

## 2025-01-04 LAB
LABORATORY COMMENT REPORT: NORMAL
PATH REPORT.COMMENTS IMP SPEC: NORMAL
PATH REPORT.FINAL DX SPEC: NORMAL
PATH REPORT.GROSS SPEC: NORMAL
PATH REPORT.RELEVANT HX SPEC: NORMAL
PATH REPORT.TOTAL CANCER: NORMAL

## 2025-01-07 ENCOUNTER — LAB (OUTPATIENT)
Dept: LAB | Facility: LAB | Age: 75
End: 2025-01-07
Payer: MEDICARE

## 2025-01-07 ENCOUNTER — APPOINTMENT (OUTPATIENT)
Dept: PRIMARY CARE | Facility: CLINIC | Age: 75
End: 2025-01-07
Payer: MEDICARE

## 2025-01-07 VITALS
RESPIRATION RATE: 16 BRPM | WEIGHT: 184 LBS | HEIGHT: 63 IN | SYSTOLIC BLOOD PRESSURE: 122 MMHG | DIASTOLIC BLOOD PRESSURE: 78 MMHG | HEART RATE: 80 BPM | OXYGEN SATURATION: 100 % | TEMPERATURE: 98 F | BODY MASS INDEX: 32.6 KG/M2

## 2025-01-07 DIAGNOSIS — R79.9 ABNORMAL BLOOD CHEMISTRY: ICD-10-CM

## 2025-01-07 DIAGNOSIS — Z78.0 ASYMPTOMATIC MENOPAUSAL STATE: ICD-10-CM

## 2025-01-07 DIAGNOSIS — E05.10 THYROID NODULE, TOXIC OR WITH HYPERTHYROIDISM: ICD-10-CM

## 2025-01-07 DIAGNOSIS — Z17.0 MALIGNANT NEOPLASM OF RIGHT BREAST IN FEMALE, ESTROGEN RECEPTOR POSITIVE, UNSPECIFIED SITE OF BREAST: ICD-10-CM

## 2025-01-07 DIAGNOSIS — Z23 NEED FOR VACCINATION: ICD-10-CM

## 2025-01-07 DIAGNOSIS — Z00.00 ROUTINE GENERAL MEDICAL EXAMINATION AT HEALTH CARE FACILITY: ICD-10-CM

## 2025-01-07 DIAGNOSIS — E04.1 LEFT THYROID NODULE: ICD-10-CM

## 2025-01-07 DIAGNOSIS — E55.9 VITAMIN D DEFICIENCY: ICD-10-CM

## 2025-01-07 DIAGNOSIS — C50.911 MALIGNANT NEOPLASM OF RIGHT BREAST IN FEMALE, ESTROGEN RECEPTOR POSITIVE, UNSPECIFIED SITE OF BREAST: ICD-10-CM

## 2025-01-07 DIAGNOSIS — Z13.220 SCREENING FOR HYPERLIPIDEMIA: ICD-10-CM

## 2025-01-07 DIAGNOSIS — Z00.00 ROUTINE GENERAL MEDICAL EXAMINATION AT HEALTH CARE FACILITY: Primary | ICD-10-CM

## 2025-01-07 LAB
ALBUMIN SERPL BCP-MCNC: 4.5 G/DL (ref 3.4–5)
ALP SERPL-CCNC: 78 U/L (ref 33–136)
ALT SERPL W P-5'-P-CCNC: 17 U/L (ref 7–45)
ANION GAP SERPL CALC-SCNC: 8 MMOL/L (ref 10–20)
AST SERPL W P-5'-P-CCNC: 16 U/L (ref 9–39)
BASOPHILS # BLD AUTO: 0.05 X10*3/UL (ref 0–0.1)
BASOPHILS NFR BLD AUTO: 0.7 %
BILIRUB SERPL-MCNC: 1.4 MG/DL (ref 0–1.2)
BUN SERPL-MCNC: 10 MG/DL (ref 6–23)
CALCIUM SERPL-MCNC: 9.5 MG/DL (ref 8.6–10.3)
CHLORIDE SERPL-SCNC: 102 MMOL/L (ref 98–107)
CHOLEST SERPL-MCNC: 219 MG/DL (ref 0–199)
CHOLESTEROL/HDL RATIO: 3.2
CO2 SERPL-SCNC: 29 MMOL/L (ref 21–32)
CREAT SERPL-MCNC: 0.58 MG/DL (ref 0.5–1.05)
EGFRCR SERPLBLD CKD-EPI 2021: >90 ML/MIN/1.73M*2
EOSINOPHIL # BLD AUTO: 0.11 X10*3/UL (ref 0–0.4)
EOSINOPHIL NFR BLD AUTO: 1.5 %
ERYTHROCYTE [DISTWIDTH] IN BLOOD BY AUTOMATED COUNT: 12.7 % (ref 11.5–14.5)
GLUCOSE SERPL-MCNC: 91 MG/DL (ref 74–99)
HCT VFR BLD AUTO: 44.1 % (ref 36–46)
HDLC SERPL-MCNC: 69.3 MG/DL
HGB BLD-MCNC: 14.6 G/DL (ref 12–16)
IMM GRANULOCYTES # BLD AUTO: 0.02 X10*3/UL (ref 0–0.5)
IMM GRANULOCYTES NFR BLD AUTO: 0.3 % (ref 0–0.9)
LDLC SERPL CALC-MCNC: 128 MG/DL
LYMPHOCYTES # BLD AUTO: 1.42 X10*3/UL (ref 0.8–3)
LYMPHOCYTES NFR BLD AUTO: 19.5 %
MCH RBC QN AUTO: 31.1 PG (ref 26–34)
MCHC RBC AUTO-ENTMCNC: 33.1 G/DL (ref 32–36)
MCV RBC AUTO: 94 FL (ref 80–100)
MONOCYTES # BLD AUTO: 0.52 X10*3/UL (ref 0.05–0.8)
MONOCYTES NFR BLD AUTO: 7.1 %
NEUTROPHILS # BLD AUTO: 5.16 X10*3/UL (ref 1.6–5.5)
NEUTROPHILS NFR BLD AUTO: 70.9 %
NON HDL CHOLESTEROL: 150 MG/DL (ref 0–149)
NRBC BLD-RTO: 0 /100 WBCS (ref 0–0)
PLATELET # BLD AUTO: 181 X10*3/UL (ref 150–450)
POTASSIUM SERPL-SCNC: 4.4 MMOL/L (ref 3.5–5.3)
PROT SERPL-MCNC: 7.5 G/DL (ref 6.4–8.2)
RBC # BLD AUTO: 4.69 X10*6/UL (ref 4–5.2)
SODIUM SERPL-SCNC: 135 MMOL/L (ref 136–145)
T4 FREE SERPL-MCNC: 1.02 NG/DL (ref 0.61–1.12)
TRIGL SERPL-MCNC: 110 MG/DL (ref 0–149)
TSH SERPL-ACNC: 2.75 MIU/L (ref 0.44–3.98)
VLDL: 22 MG/DL (ref 0–40)
WBC # BLD AUTO: 7.3 X10*3/UL (ref 4.4–11.3)

## 2025-01-07 PROCEDURE — 80061 LIPID PANEL: CPT

## 2025-01-07 PROCEDURE — G0439 PPPS, SUBSEQ VISIT: HCPCS | Performed by: NURSE PRACTITIONER

## 2025-01-07 PROCEDURE — 84443 ASSAY THYROID STIM HORMONE: CPT

## 2025-01-07 PROCEDURE — 1160F RVW MEDS BY RX/DR IN RCRD: CPT | Performed by: NURSE PRACTITIONER

## 2025-01-07 PROCEDURE — 1036F TOBACCO NON-USER: CPT | Performed by: NURSE PRACTITIONER

## 2025-01-07 PROCEDURE — 83036 HEMOGLOBIN GLYCOSYLATED A1C: CPT

## 2025-01-07 PROCEDURE — 80053 COMPREHEN METABOLIC PANEL: CPT

## 2025-01-07 PROCEDURE — 1123F ACP DISCUSS/DSCN MKR DOCD: CPT | Performed by: NURSE PRACTITIONER

## 2025-01-07 PROCEDURE — 84439 ASSAY OF FREE THYROXINE: CPT

## 2025-01-07 PROCEDURE — 1157F ADVNC CARE PLAN IN RCRD: CPT | Performed by: NURSE PRACTITIONER

## 2025-01-07 PROCEDURE — 85025 COMPLETE CBC W/AUTO DIFF WBC: CPT

## 2025-01-07 PROCEDURE — 90662 IIV NO PRSV INCREASED AG IM: CPT | Performed by: NURSE PRACTITIONER

## 2025-01-07 PROCEDURE — 1159F MED LIST DOCD IN RCRD: CPT | Performed by: NURSE PRACTITIONER

## 2025-01-07 PROCEDURE — G0008 ADMIN INFLUENZA VIRUS VAC: HCPCS | Performed by: NURSE PRACTITIONER

## 2025-01-07 PROCEDURE — 1125F AMNT PAIN NOTED PAIN PRSNT: CPT | Performed by: NURSE PRACTITIONER

## 2025-01-07 PROCEDURE — 1158F ADVNC CARE PLAN TLK DOCD: CPT | Performed by: NURSE PRACTITIONER

## 2025-01-07 PROCEDURE — 1170F FXNL STATUS ASSESSED: CPT | Performed by: NURSE PRACTITIONER

## 2025-01-07 PROCEDURE — 3008F BODY MASS INDEX DOCD: CPT | Performed by: NURSE PRACTITIONER

## 2025-01-07 PROCEDURE — 84481 FREE ASSAY (FT-3): CPT

## 2025-01-07 ASSESSMENT — PATIENT HEALTH QUESTIONNAIRE - PHQ9
2. FEELING DOWN, DEPRESSED OR HOPELESS: NOT AT ALL
SUM OF ALL RESPONSES TO PHQ9 QUESTIONS 1 AND 2: 0
1. LITTLE INTEREST OR PLEASURE IN DOING THINGS: NOT AT ALL

## 2025-01-07 ASSESSMENT — ENCOUNTER SYMPTOMS
LOSS OF SENSATION IN FEET: 0
OCCASIONAL FEELINGS OF UNSTEADINESS: 0
DEPRESSION: 0

## 2025-01-07 ASSESSMENT — PAIN SCALES - GENERAL: PAINLEVEL_OUTOF10: 3

## 2025-01-07 ASSESSMENT — ACTIVITIES OF DAILY LIVING (ADL)
BATHING: INDEPENDENT
GROCERY_SHOPPING: INDEPENDENT
MANAGING_FINANCES: INDEPENDENT
DOING_HOUSEWORK: INDEPENDENT
DRESSING: INDEPENDENT
TAKING_MEDICATION: INDEPENDENT

## 2025-01-07 NOTE — PROGRESS NOTES
"Subjective   Reason for Visit: Charlotte Daniels is an 75 y.o. female here for a Medicare Wellness visit.     Past Medical, Surgical, and Family History reviewed and updated in chart.    Reviewed all medications by prescribing practitioner or clinical pharmacist (such as prescriptions, OTCs, herbal therapies and supplements) and documented in the medical record.    Patient has living will and POA but not on file, states that she will bring in with her next time   Patient states that it has been harder to do aerobics but has been awhile and is trying to get back in routine   Patient would like to review her mammogram results so she can understand it   Questions on her thyroid medication and diet due to reading about how she should not eat certain things     Colonoscopy 6/2022, repeat in 5 years?  Mammogram   Needs DEXA  Taking levothyroxine.     Patient Care Team:  JEY Villar-CNP as PCP - General (Internal Medicine)     Review of Systems   Constitutional:  Negative for chills, fatigue and fever.   HENT:  Negative for congestion, ear pain, rhinorrhea, sinus pressure and sore throat.    Eyes:  Negative for pain, discharge and itching.   Respiratory:  Negative for cough, shortness of breath and wheezing.    Cardiovascular:  Negative for chest pain and palpitations.   Gastrointestinal:  Negative for constipation, diarrhea, nausea and vomiting.   Genitourinary:  Negative for difficulty urinating and dysuria.   Musculoskeletal:  Negative for back pain, joint swelling and myalgias.   Skin:  Negative for color change.   Neurological:  Negative for headaches.   Hematological:  Negative for adenopathy.   Psychiatric/Behavioral:  Negative for decreased concentration. The patient is not nervous/anxious.    All other systems reviewed and are negative.      Objective   Vitals:  /78   Pulse 80   Temp 36.7 °C (98 °F)   Resp 16   Ht 1.6 m (5' 3\")   Wt 83.5 kg (184 lb)   SpO2 100%   BMI 32.59 kg/m²   "     Physical Exam  Constitutional:       General: She is not in acute distress.     Appearance: She is not ill-appearing.   HENT:      Head: Normocephalic and atraumatic.      Right Ear: Tympanic membrane, ear canal and external ear normal.      Left Ear: Tympanic membrane, ear canal and external ear normal.      Nose: Nose normal.      Mouth/Throat:      Mouth: Mucous membranes are moist.      Pharynx: Oropharynx is clear.   Eyes:      Conjunctiva/sclera: Conjunctivae normal.      Pupils: Pupils are equal, round, and reactive to light.   Cardiovascular:      Rate and Rhythm: Normal rate and regular rhythm.      Pulses: Normal pulses.      Heart sounds: Normal heart sounds.   Pulmonary:      Effort: Pulmonary effort is normal. No respiratory distress.      Breath sounds: Normal breath sounds.   Abdominal:      General: Bowel sounds are normal.      Palpations: Abdomen is soft.      Tenderness: There is no abdominal tenderness.   Musculoskeletal:         General: Normal range of motion.      Cervical back: Normal range of motion.   Skin:     General: Skin is warm and dry.   Neurological:      General: No focal deficit present.      Mental Status: She is alert and oriented to person, place, and time.   Psychiatric:         Mood and Affect: Mood normal.         Behavior: Behavior normal.         Thought Content: Thought content normal.         Judgment: Judgment normal.         Assessment & Plan  Routine general medical examination at health care facility    Orders:    1 Year Follow Up In [facility] - PCP - Wellness Exam; Future    Comprehensive Metabolic Panel; Future    Abnormal blood chemistry    Orders:    Hemoglobin A1C - Lab collect; Future    CBC and Auto Differential; Future    Need for vaccination    Orders:    Flu vaccine, high dose    Asymptomatic menopausal state    Orders:    XR DEXA bone density; Future    CBC and Auto Differential; Future    Screening for hyperlipidemia    Orders:    Lipid  Panel; Future    Left thyroid nodule    Orders:    TSH; Future    Thyroxine, Free; Future    Triiodothyronine, Free; Future    Vitamin D deficiency    Orders:    Vitamin D 25-Hydroxy,Total (for eval of Vitamin D levels); Future    Thyroid nodule, toxic or with hyperthyroidism    Orders:    levothyroxine (Synthroid) 25 mcg tablet; Take 1 tablet (25 mcg) by mouth early in the morning.. Take on an empty stomach at the same time each day, either 30 to 60 minutes prior to breakfast    Malignant neoplasm of right breast in female, estrogen receptor positive, unspecified site of breast  1.7.2025- stable, completed medical and surgical oncology, has yearly mammograms          There are no Patient Instructions on file for this visit.

## 2025-01-07 NOTE — ASSESSMENT & PLAN NOTE
Orders:    levothyroxine (Synthroid) 25 mcg tablet; Take 1 tablet (25 mcg) by mouth early in the morning.. Take on an empty stomach at the same time each day, either 30 to 60 minutes prior to breakfast

## 2025-01-08 ENCOUNTER — TREATMENT (OUTPATIENT)
Dept: PHYSICAL THERAPY | Facility: CLINIC | Age: 75
End: 2025-01-08
Payer: MEDICARE

## 2025-01-08 ENCOUNTER — OFFICE VISIT (OUTPATIENT)
Dept: SURGERY | Facility: CLINIC | Age: 75
End: 2025-01-08
Payer: MEDICARE

## 2025-01-08 VITALS
TEMPERATURE: 98.1 F | DIASTOLIC BLOOD PRESSURE: 87 MMHG | OXYGEN SATURATION: 98 % | SYSTOLIC BLOOD PRESSURE: 138 MMHG | RESPIRATION RATE: 18 BRPM | HEART RATE: 80 BPM

## 2025-01-08 DIAGNOSIS — Z12.31 BREAST CANCER SCREENING BY MAMMOGRAM: ICD-10-CM

## 2025-01-08 DIAGNOSIS — M17.11 OSTEOARTHRITIS OF RIGHT KNEE, UNSPECIFIED OSTEOARTHRITIS TYPE: ICD-10-CM

## 2025-01-08 DIAGNOSIS — Z17.0 MALIGNANT NEOPLASM OF RIGHT BREAST IN FEMALE, ESTROGEN RECEPTOR POSITIVE, UNSPECIFIED SITE OF BREAST: ICD-10-CM

## 2025-01-08 DIAGNOSIS — R92.8 ABNORMAL MAMMOGRAM: Primary | ICD-10-CM

## 2025-01-08 DIAGNOSIS — C50.911 MALIGNANT NEOPLASM OF RIGHT BREAST IN FEMALE, ESTROGEN RECEPTOR POSITIVE, UNSPECIFIED SITE OF BREAST: ICD-10-CM

## 2025-01-08 LAB
EST. AVERAGE GLUCOSE BLD GHB EST-MCNC: 100 MG/DL
HBA1C MFR BLD: 5.1 %
T3FREE SERPL-MCNC: 3.2 PG/ML (ref 2.3–4.2)

## 2025-01-08 PROCEDURE — 99213 OFFICE O/P EST LOW 20 MIN: CPT | Performed by: SURGERY

## 2025-01-08 PROCEDURE — 1123F ACP DISCUSS/DSCN MKR DOCD: CPT | Performed by: SURGERY

## 2025-01-08 PROCEDURE — 1159F MED LIST DOCD IN RCRD: CPT | Performed by: SURGERY

## 2025-01-08 PROCEDURE — 1157F ADVNC CARE PLAN IN RCRD: CPT | Performed by: SURGERY

## 2025-01-08 PROCEDURE — 1036F TOBACCO NON-USER: CPT | Performed by: SURGERY

## 2025-01-08 PROCEDURE — 97110 THERAPEUTIC EXERCISES: CPT | Mod: GP | Performed by: GENERAL ACUTE CARE HOSPITAL

## 2025-01-08 PROCEDURE — 1126F AMNT PAIN NOTED NONE PRSNT: CPT | Performed by: SURGERY

## 2025-01-08 PROCEDURE — 1160F RVW MEDS BY RX/DR IN RCRD: CPT | Performed by: SURGERY

## 2025-01-08 RX ORDER — LEVOTHYROXINE SODIUM 25 UG/1
25 TABLET ORAL DAILY
Qty: 90 TABLET | Refills: 3 | Status: SHIPPED | OUTPATIENT
Start: 2025-01-08 | End: 2026-01-03

## 2025-01-08 ASSESSMENT — ENCOUNTER SYMPTOMS
MYALGIAS: 0
OCCASIONAL FEELINGS OF UNSTEADINESS: 0
WHEEZING: 0
CONSTIPATION: 0
NERVOUS/ANXIOUS: 0
FATIGUE: 0
EYE ITCHING: 0
DEPRESSION: 0
PALPITATIONS: 0
HEADACHES: 0
FEVER: 0
JOINT SWELLING: 0
DECREASED CONCENTRATION: 0
EYE DISCHARGE: 0
COLOR CHANGE: 0
ADENOPATHY: 0
DIARRHEA: 0
SORE THROAT: 0
NAUSEA: 0
SHORTNESS OF BREATH: 0
EYE PAIN: 0
DYSURIA: 0
DIFFICULTY URINATING: 0
VOMITING: 0
CHILLS: 0
COUGH: 0
RHINORRHEA: 0
BACK PAIN: 0
LOSS OF SENSATION IN FEET: 0
SINUS PRESSURE: 0

## 2025-01-08 ASSESSMENT — PAIN SCALES - GENERAL: PAINLEVEL_OUTOF10: 0-NO PAIN

## 2025-01-08 NOTE — PROGRESS NOTES
Patient Name: Charlotte Daniels  MRN: [id number]  Today's Date: 1/8/2025  Time Calculation  Start Time: 0915  Stop Time: 1000  Time Calculation (min): 45 min  Current Problem:  1. Osteoarthritis of right knee, unspecified osteoarthritis type  Follow Up In Physical Therapy          Visit 11/20    Subjective:  Change in pain/ pain level: 2/10, yesterday was bad 4/10, rode bike day before and not sure if it was from that, have not been on bike regularly   Change in function: knee was doing well and had a scare with breast mammogram, being stressed   Patient comments: having trouble going down steps     Objective: reviewed HEP     Treatment:    Therapeutic exercise (28973):  Access Code: [email or web address]  URL: [email or web address]  Date: 01/08/2025  Prepared by: Pedrito Moody    Exercises  - Mountain Pose with Feet Together   - 3 x weekly - 30 hold  - Standing Cervical Rotation AROM  - 3 x weekly - 10 reps  - Standing Cervical Retraction  - 3 x weekly - 10 reps  - Standing Reach to Opposite Side with Weight Shift  - 3 x weekly - 20 reps  - Seated Long Arc Quad  - 3 x weekly - 20 reps  - Standing Hip Abduction with Counter Support  - 3 x weekly - 20 reps  - Standing Knee Flexion AROM with Chair Support  - 3 x weekly - 20 reps  - Heel Raises with Counter Support  - 3 x weekly - 20 reps  - Heel Toe Raises with Counter Support  - 3 x weekly - 20 reps  - Mini Squat with Counter Support  - 3 x weekly - 2 sets - 10 reps  - Sit to Stand  - 3 x weekly - 2 sets - 10 reps  - Tandem Stance  - 3 x weekly - 30 hold  - Standing Single Leg Stance with Counter Support  - 3 x weekly - 30 hold  - Side Stepping with Counter Support  - 3 x weekly - 20 reps  - Backwards Walking  - 3 x weekly - 20 reps  - Tandem Walking with Counter Support  - 3 x weekly - 20 reps  - Figure-8 Walking Around [place]es  - 3 x weekly - 10 reps  - Heel Walking with Counter Support  - 3 x weekly - 20 reps  - Toe Walking with Counter Support  - 3 x  weekly - 20 reps    Assessment:  Improved pain levels with HEP. Encouraged to continue and incorporate balance exercises. Patient continues to require active therapy to progress functional capabilities with decreasing pain levels and improving mechanics with functional activities including progression of Home exercise program. Tolerance to today's treatment was good.   Patient appears motivated and compliant this date, demonstrating a working understanding of principals instructed and home program.    Plan:  Progress with functional strength balance as able

## 2025-01-08 NOTE — PATIENT INSTRUCTIONS
Thank you for choosing [facility].  The site of your recent left stereotactic breast biopsy is without signs of infection.  Pathological analysis of the tissue removed demonstrated no evidence of breast cancer nor cells to indicate increased risk of breast cancer in this area.  Please continue monthly self-exam.  Please follow-up for any change in self-exam including but not limited to mass dimpling discharge or any concern.  Annual mammograms with exam are recommended for December 2025.  Contact the breast clinic for any questions or concerns

## 2025-01-09 ENCOUNTER — TREATMENT (OUTPATIENT)
Dept: PHYSICAL THERAPY | Facility: CLINIC | Age: 75
End: 2025-01-09
Payer: MEDICARE

## 2025-01-09 DIAGNOSIS — M17.11 OSTEOARTHRITIS OF RIGHT KNEE, UNSPECIFIED OSTEOARTHRITIS TYPE: ICD-10-CM

## 2025-01-09 PROCEDURE — 97113 AQUATIC THERAPY/EXERCISES: CPT | Mod: CQ,GP

## 2025-01-09 ASSESSMENT — PAIN SCALES - GENERAL: PAINLEVEL_OUTOF10: 2

## 2025-01-09 ASSESSMENT — PAIN - FUNCTIONAL ASSESSMENT: PAIN_FUNCTIONAL_ASSESSMENT: 0-10

## 2025-01-09 NOTE — PROGRESS NOTES
Physical Therapy Treatment    Patient Name: Charlotte Daniels  MRN: [id number]  Today's Date: 1/9/2025  Time Calculation  Start Time: 1400  Stop Time: 1430  Time Calculation (min): 30 min    Insurance  2025 // 0% coins, $257 ded (met), no copay, bmn carlos alberto yr, no PA.      Medicare A/B // AARP Medicare Suppl Plan F  $0 spent towards therapy cap as of 1/1/25.  KX mod needed after ~20v carlos alberto yr.  Part B ded & coins covered    Visit 13/20     Current Problem  1. Osteoarthritis of right knee, unspecified osteoarthritis type  Follow Up In Physical Therapy          Subjective    General   Pt returning to pool today following 4 week lapse in care.  States the holidays were a little stressful due to having to have a biopsy done after a mammogram was completed.  She reports recently receiving good results from the biopsy & is feeling less stressed.  No new complaints.  She is happy to be returning to the pool today.     Precautions  Precautions  Precautions Comment: No recent falls reported    Pain  Pain Assessment  Pain Assessment: 0-10  0-10 (Numeric) Pain Score: 2  Pain Location: Knee  Pain Orientation: Right  Pain Radiating Towards: none reported  Pain Frequency: Intermittent  Clinical Progression: Gradually improving  Effect of Pain on Daily Activities: Walking>10-15 minutes (Improving)    Objective   Mild antalgic gait noted on pool deck.  No assistive device.    Treatments:  Aquatic Therapy (94114): 30 Minutes, 2 Units    Activities:  Aquatic Gait Activities: F/B/L, 2 laps each  HR/TR: x15  March in Place: x15  Hip PRE's: x10 each VIVIAN  Squats: x10  HS Curl: x15 VIVIAN  LAQ: x15 VIVIAN  Noodle Pull Down: x10  Noodle Rotation: x5 each   Stag. Stance Row: Paddles, Lv1, 2x10 (switch stance after  10)  DLS w/UE mvmt: Paddles, Lv1, x10 each   F/L Step Up: -->Add NV     Assessment:   Pt resumes aquatic activities today following 4 week lapse in care.  Repetitions were reduced with several activities & resistance also decreased with  paddle activities secondary to lapse in care.  No new activities were added this visit.  She exhibits good recall of the ex's & did well with current activities.  She was able to complete with minimal difficulty & no increase in c/o pain.  Normal muscle fatigue reported.  Overall, demonstrates good ability to resume progressing POC as tolerated.    Plan:   Assess return to pool following 4 week lapse.  Continue with POC & progress with activities as tolerated to increase LE strength, stability & improve functional mobility.

## 2025-01-14 ENCOUNTER — TREATMENT (OUTPATIENT)
Dept: PHYSICAL THERAPY | Facility: CLINIC | Age: 75
End: 2025-01-14
Payer: MEDICARE

## 2025-01-14 DIAGNOSIS — M17.11 OSTEOARTHRITIS OF RIGHT KNEE, UNSPECIFIED OSTEOARTHRITIS TYPE: ICD-10-CM

## 2025-01-14 PROCEDURE — 97113 AQUATIC THERAPY/EXERCISES: CPT | Mod: CQ,GP

## 2025-01-14 ASSESSMENT — PAIN DESCRIPTION - DESCRIPTORS: DESCRIPTORS: ACHING

## 2025-01-14 ASSESSMENT — PAIN SCALES - GENERAL: PAINLEVEL_OUTOF10: 2

## 2025-01-14 ASSESSMENT — PAIN - FUNCTIONAL ASSESSMENT: PAIN_FUNCTIONAL_ASSESSMENT: 0-10

## 2025-01-14 NOTE — PROGRESS NOTES
"Physical Therapy Treatment    Patient Name: Charlotte Daniels  MRN: [id number]  Today's Date: 1/14/2025  Time Calculation  Start Time: 1700  Stop Time: 1734  Time Calculation (min): 34 min    Insurance  2025 // 0% coins, $257 ded (met), no copay, bmn carlos alberto yr, no PA.       Medicare A/B // AARP Medicare Suppl Plan F  $0 spent towards therapy cap as of 1/1/25.  KX mod needed after ~20v carlos alberto yr.  Part B ded & coins covered     Visit 14/20     Current Problem  1. Osteoarthritis of right knee, unspecified osteoarthritis type  Follow Up In Physical Therapy          Subjective    General   Pt states she was \"not bad\" following previous visit with return to pool following 1 month lapse.  However, she does report increased sx's following the next day after adding HEP back into routine.  States this increase in sx's remained for about a day.  She is doing well today.  States she is starting to have some good days.     Precautions  Precautions  Precautions Comment: No recent falls reported    Pain  Pain Assessment  Pain Assessment: 0-10  0-10 (Numeric) Pain Score: 2  Pain Location: Knee  Pain Orientation: Right  Pain Radiating Towards: none reported  Pain Descriptors: Aching  Pain Frequency: Intermittent  Clinical Progression: Gradually improving  Effect of Pain on Daily Activities: Walking, Standing > 15 minutes; Negotiating Stairs    Objective   No c/o numbness or tingling.  Pt exhibits antalgic gait on pool deck without assistive device.    Treatments:  Aquatic Therapy (46506): 34 Minutes, 2 Units    Activities:  Aquatic Gait Activities: F/B/L, 2 laps each  HR/TR: x20  March in Place: x20  Hip PRE's: x10 each VIVIAN  Squats: x15  HS Curl: x15 VIVIAN  LAQ: x15 VIVIAN  Noodle Pull Down: x5  Noodle Rotation: x5 each   Stag. Stance Row: Paddles, Lv5, 2x10 (switch stance after  10)  DLS w/UE mvmt: Paddles, Lv5, x10 each   F/L Step Up: x10 each     Assessment:   Pt continued with current aquatic POC this visit, increasing reps as noted with " as few activities.  She was also able to add step activities to the activities performed today to further strengthen & increase capacity for negotiating stairs at home.  She did well with fwd step up, but more challenged & reports some discomfort with lateral step up.  Able to complete 10 reps of each.    She was also able to increase paddle resistance to further challenge stability this visit with good tolerance.  Overall, did well with session today.  Normal muscle soreness, fatigue reported post activity.  Anticipate she will be able to continue progressing with POC as tolerated.    Plan:   Continue to progress with aquatic activities as tolerated to increase LE strength, stability & improve functional mobility, capacity for performing daily activities.

## 2025-01-16 ENCOUNTER — APPOINTMENT (OUTPATIENT)
Dept: RADIOLOGY | Facility: HOSPITAL | Age: 75
End: 2025-01-16
Payer: MEDICARE

## 2025-01-17 ENCOUNTER — HOSPITAL ENCOUNTER (OUTPATIENT)
Dept: RADIOLOGY | Facility: HOSPITAL | Age: 75
Discharge: HOME | End: 2025-01-17
Payer: MEDICARE

## 2025-01-17 DIAGNOSIS — Z78.0 ASYMPTOMATIC MENOPAUSAL STATE: ICD-10-CM

## 2025-01-17 PROCEDURE — 77080 DXA BONE DENSITY AXIAL: CPT | Performed by: RADIOLOGY

## 2025-01-17 PROCEDURE — 77080 DXA BONE DENSITY AXIAL: CPT

## 2025-01-22 ENCOUNTER — TREATMENT (OUTPATIENT)
Dept: PHYSICAL THERAPY | Facility: CLINIC | Age: 75
End: 2025-01-22
Payer: MEDICARE

## 2025-01-22 DIAGNOSIS — M17.11 OSTEOARTHRITIS OF RIGHT KNEE, UNSPECIFIED OSTEOARTHRITIS TYPE: ICD-10-CM

## 2025-01-22 PROCEDURE — 97113 AQUATIC THERAPY/EXERCISES: CPT | Mod: GP,CQ

## 2025-01-22 NOTE — PROGRESS NOTES
Patient Name: Charlotte Daniels  MRN: [id number]  Today's Date: 1/22/2025  Time Calculation  Start Time: 1500  Stop Time: 1530  Time Calculation (min): 30 min  Visit 14/20     Subjective:  States that she has gone over to [facility] to perform some of the aquatic exercises that she has learned so far with sessions .    Objective:  [email or web address]   [email or web address]  Access Code: [email or web address]    Assessment:   Pt demos decreased R LE stability with performance of SL R LE loading exercises. Cues for improved glute activation to improve control and limit compensatory movemetns. Good ability to correct and maintain post cues .     Plan:   Issue  hand out of  emailed HEP     Treatment :   Aquatic Therapy (41384): 30 Minutes, 2 Units     Activities:  Aquatic Gait Activities: F/B/L, 2 laps each  HR/TR: x15  March in Place: x15  Hip PRE's: x10 each VIVIAN  Squats: x10  HS Curl: x15 VIVIAN  LAQ: x15 VIVIAN incresaed pain in R Knee today   SL cycle 1 minute   Noodle Pull Down: x10  Noodle Rotation: x5 each   Stag. Stance Row: Paddles, Lv1, 2x10 (switch stance after  10)  DLS w/UE mvmt: Paddles, Lv1, x10 each   F/L Step Up: -->Add NV              Current Problem:  1. Osteoarthritis of right knee, unspecified osteoarthritis type  Follow Up In Physical Therapy

## 2025-01-30 ENCOUNTER — TREATMENT (OUTPATIENT)
Dept: PHYSICAL THERAPY | Facility: CLINIC | Age: 75
End: 2025-01-30
Payer: MEDICARE

## 2025-01-30 DIAGNOSIS — M17.11 OSTEOARTHRITIS OF RIGHT KNEE, UNSPECIFIED OSTEOARTHRITIS TYPE: ICD-10-CM

## 2025-01-30 PROCEDURE — 97113 AQUATIC THERAPY/EXERCISES: CPT | Mod: CQ,GP

## 2025-01-30 ASSESSMENT — PAIN SCALES - GENERAL: PAINLEVEL_OUTOF10: 2

## 2025-01-30 ASSESSMENT — PAIN - FUNCTIONAL ASSESSMENT: PAIN_FUNCTIONAL_ASSESSMENT: 0-10

## 2025-01-30 NOTE — PROGRESS NOTES
"Physical Therapy Treatment    Patient Name: Charlotte Daniels  MRN: [id number]  Today's Date: 1/30/2025  Time Calculation  Start Time: 1432  Stop Time: 1520  Time Calculation (min): 48 min    Insurance  2025 // 0% coins, $257 ded (met), no copay, bmn carlos alberto yr, no PA.       Medicare A/B // [employer] Medicare Suppl Plan F  $0 spent towards therapy cap as of 1/1/25.  KX mod needed after ~20v carlos alberto yr.  Part B ded & coins covered     Visit 16/20     Current Problem  1. Osteoarthritis of right knee, unspecified osteoarthritis type  Follow Up In Physical Therapy          Subjective    General   Pt reports having a bone density test done last week.  States \"It was good, so I'm happy.\"  She reports some increased sx's yesterday after a pipe broke at her house & having to do a lot of clean up.  However, states she is doing better today.  No new complaints.     Precautions   No recent falls reported    Pain  Pain Assessment  Pain Assessment: 0-10  0-10 (Numeric) Pain Score: 2  Pain Location: Knee  Pain Orientation: Right  Pain Radiating Towards: none reported  Pain Frequency: Intermittent  Clinical Progression: Gradually improving  Effect of Pain on Daily Activities: Walking, Standing>15 min, negotiating stairs    Objective   No c/o numbness, tingling.    Treatments:  Aquatic Therapy (47103): 48 Minutes, 3 Units    Activities:  Aquatic Gait Activities: F/B/L, 2 laps each  HR/TR: x15  March in Place: x15  Hip PRE's: x15 each VIVIAN  Squats: x15  HS Curl: x15 VIVIAN  LAQ: x15 VIVIAN   SL cycle 1 minute each  Noodle Pull Down: x15  Noodle Rotation: x5 each   Stag. Stance Row: Paddles, Lv5, 2x15 (switch stance after  15)  DLS w/UE mvmt: Paddles, Lv5, x15 each   F/L Step Up: -->Add NV     Assessment:   Pt continues to do well with aquatic POC, making progressions as noted to increase overall strength, stability & improve functional mobility, capacity for performing daily activities.  Demonstrated good recall of activities & minimal vc's needed for " overall technique.  Occasional cues for core stabilization, posture with good follow through observed.  She exhibits good ability to continue progressing with POC as tolerated.    Plan:    Continue to progress with aquatic activities as tolerated to increase LE strength, stability & improve functional mobility, capacity for performing daily activities.

## 2025-02-04 ENCOUNTER — TREATMENT (OUTPATIENT)
Dept: PHYSICAL THERAPY | Facility: CLINIC | Age: 75
End: 2025-02-04
Payer: MEDICARE

## 2025-02-04 DIAGNOSIS — M17.11 OSTEOARTHRITIS OF RIGHT KNEE, UNSPECIFIED OSTEOARTHRITIS TYPE: ICD-10-CM

## 2025-02-04 PROCEDURE — 97113 AQUATIC THERAPY/EXERCISES: CPT | Mod: CQ,GP

## 2025-02-04 ASSESSMENT — PAIN DESCRIPTION - DESCRIPTORS: DESCRIPTORS: ACHING

## 2025-02-04 ASSESSMENT — PAIN SCALES - GENERAL: PAINLEVEL_OUTOF10: 2

## 2025-02-04 ASSESSMENT — PAIN - FUNCTIONAL ASSESSMENT: PAIN_FUNCTIONAL_ASSESSMENT: 0-10

## 2025-02-04 NOTE — PROGRESS NOTES
"Physical Therapy Treatment    Patient Name: Charlotte Daniels  MRN: [id number]  Today's Date: 2/4/2025  Time Calculation  Start Time: 1601  Stop Time: 1632  Time Calculation (min): 31 min    Insurance  2025 // 0% coins, $257 ded (met), no copay, bmn carlos alberto yr, no PA.       Medicare A/B // [employer] Medicare Suppl Plan F  $0 spent towards therapy cap as of 1/1/25.  KX mod needed after ~20v carlos alberto yr.  Part B ded & coins covered     Visit 17/20     Current Problem  1. Osteoarthritis of right knee, unspecified osteoarthritis type  Follow Up In Physical Therapy          Subjective    General   Pt reports she has been doing well.  No new complaints.  \"I feel like it's between a 1 and a 2 almost all the time now.  I'm feeling really pretty good these days.\"  She reports improved tolerance for walking & able to walk longer time periods, distances before sx's begin.  She does still take the cane with her when walking outside, but states she \"feels like it's more of a nuisance.\"  She reports negotiating stairs can still be hit or miss & some days she is able to do without difficulty & other days has to go one at a time.  Descending stairs more difficult than ascending.  Overall, doing well, improving.     Precautions  Precautions  Precautions Comment: No recent falls reported    Pain  Pain Assessment  Pain Assessment: 0-10  0-10 (Numeric) Pain Score: 2  Pain Location: Knee  Pain Orientation: Right  Pain Radiating Towards: none reported  Pain Descriptors: Aching  Pain Frequency: Intermittent  Clinical Progression: Gradually improving  Effect of Pain on Daily Activities: Walking>30-45 minutes    Objective   No c/o numbness, tingling    Treatments:  Aquatic Therapy (79394): 31 Minutes, 2 Units    Activities:  Aquatic Gait Activities: F/B/L, 2 laps each  HR/TR: x20  March Across: 1 lap  Hip PRE's: x15 each VIVIAN  Squats: x20  HS Curl: x15 VIVIAN  LAQ: x15 VIVIAN   SL cycle 1 minute each  Noodle Pull Down: x15  Noodle Rotation: x5 each   Stag. " Stance Row: Paddles, Lv5, 2x15 (switch stance after  15)  DLS w/UE mvmt: Paddles, Lv5, x15 each   F/L Step Up: 3.5 ft, x10 each    Assessment:   Progressed march in place to march across [place] this visit to further strengthen & challenge stability.  She did well with this, completing with minimal difficulty.  Occasional cues for posture, but overall good posture/form observed.  Progressed with addition of F/L step up this visit for further strengthening and increasing capacity to negotiate stairs at home.  She was a little more challenged with lateral step up than fwd step up due to overall weakness, but completes without c/o pain.  Normal muscle fatigue reported with all activities.  She is making good progress toward rehab goals.  Demonstrates good ability to continue progressing with POC as tolerated.    Plan:   Pt has 3 visits remaining on current POC (1 scheduled in pool & 2 with supervising PT).  Will continue with aquatic activities NV as tolerated to increase LE strength, stability & improve functional mobility, capacity for performing daily activities.

## 2025-02-06 ENCOUNTER — TREATMENT (OUTPATIENT)
Dept: PHYSICAL THERAPY | Facility: CLINIC | Age: 75
End: 2025-02-06
Payer: MEDICARE

## 2025-02-06 DIAGNOSIS — M17.11 OSTEOARTHRITIS OF RIGHT KNEE, UNSPECIFIED OSTEOARTHRITIS TYPE: ICD-10-CM

## 2025-02-06 PROCEDURE — 97113 AQUATIC THERAPY/EXERCISES: CPT | Mod: CQ,GP

## 2025-02-06 ASSESSMENT — PAIN SCALES - GENERAL: PAINLEVEL_OUTOF10: 1

## 2025-02-06 ASSESSMENT — PAIN - FUNCTIONAL ASSESSMENT: PAIN_FUNCTIONAL_ASSESSMENT: 0-10

## 2025-02-06 ASSESSMENT — PAIN DESCRIPTION - DESCRIPTORS: DESCRIPTORS: DULL

## 2025-02-06 NOTE — PROGRESS NOTES
"Physical Therapy Treatment    Patient Name: Charlotte Daniels  MRN: [id number]  Today's Date: 2/6/2025  Time Calculation  Start Time: 0930  Stop Time: 1005  Time Calculation (min): 35 min    Insurance  2025 // 0% coins, $257 ded (met), no copay, bmn carlos alberto yr, no PA.       Medicare A/B // [employer] Medicare Suppl Plan F  $0 spent towards therapy cap as of 1/1/25.  KX mod needed after ~20v carlos alberto yr.  Part B ded & coins covered     Visit 18/20     Current Problem  1. Osteoarthritis of right knee, unspecified osteoarthritis type  Follow Up In Physical Therapy          Subjective    General   Pt reports mild increase in sx's following previous visit with addition of step activities.  States she believes it was more from lateral step up, as discomfort was mostly along outside part of knee.  Sx's began following session & remained into the end of next day.  She is feeling much better today.  No new complaints.  This is last scheduled visit in [place] & she states she is \"very happy\" with her progress.  Also states \"I'm going to miss this\".  She is planning to get a membership somewhere to continue with independent aquatic program.     Precautions  Precautions  Precautions Comment: No recent falls reported    Pain  Pain Assessment  Pain Assessment: 0-10  0-10 (Numeric) Pain Score: 1 (at worst since previous visit, 3/10)  Pain Location: Knee  Pain Orientation: Right  Pain Radiating Towards: none reported  Pain Descriptors: Dull  Pain Frequency: Intermittent  Clinical Progression: Gradually improving  Effect of Pain on Daily Activities: Walking>30-45 min; negotiating stairs descending>ascending    Objective   No c/o numbness, tingling  No assistive device used on pool deck     Treatments:  Aquatic Therapy (06490): 35 Minutes, 2 Units    Activities:  Aquatic Gait Activities: F/B/L, 2 laps each  HR/TR: x20 March Across: 1 lap  Hip PRE's: x15 each VIVIAN  Squats: x20  HS Curl: x15 VIVIAN  LAQ: x15 VIVIAN   SL cycle 1 minute each  Noodle Pull Down: " x15  Noodle Rotation: x5 each   Stag. Stance Row: Paddles, Lv5, 2x15 (switch stance after  15)  DLS w/UE mvmt: Paddles, Lv5, x15 each   F/L Step Up: 3.5 ft, x10 each    Assessment:   Reviewed all activities, discussing progressions that can be done should she continue with independent aquatic activities with a membership here or at another facility.  She demonstrates good knowledge of the activities & has good understanding of how to progress as tolerated.  She has responded well to aquatic POC & has made good progress toward rehab goals.  Completes activities today with minimal difficulty, no increased c/o pain & normal muscle fatigue noted.  Good follow through to occasional vc's provided.  Anticipate she will continue to do well with independent program.    Plan:   Pt has completed aquatic visits & will return to land PT next visit.  2 visits remain on current POC.

## 2025-02-11 ENCOUNTER — TREATMENT (OUTPATIENT)
Dept: PHYSICAL THERAPY | Facility: CLINIC | Age: 75
End: 2025-02-11
Payer: MEDICARE

## 2025-02-11 DIAGNOSIS — M17.11 OSTEOARTHRITIS OF RIGHT KNEE, UNSPECIFIED OSTEOARTHRITIS TYPE: ICD-10-CM

## 2025-02-11 PROCEDURE — 97110 THERAPEUTIC EXERCISES: CPT | Mod: GP | Performed by: GENERAL ACUTE CARE HOSPITAL

## 2025-02-11 NOTE — PROGRESS NOTES
Patient Name: Charlotte Daniels  MRN: [id number]  Today's Date: 2/11/2025  Time Calculation  Start Time: 1400  Stop Time: 1438  Time Calculation (min): 38 min  Current Problem:  1. Osteoarthritis of right knee, unspecified osteoarthritis type  Follow Up In Physical Therapy          Visit 19/20 (8 in 2025)     Subjective:  Change in pain/ pain level: 1/10  Change in function: walking much better, pain limited at worst 4/10   Patient comments: lateral step up caused increased pain.     Objective: RKEIS: decrease better     Treatment:    Therapeutic exercise (36622):  MDT review, education HEP review     Assessment:  Lat step up caused no significant change in symptoms- advised to monitor technique in the pool. Patient notes improved pain levels resultant from activities in therapy session. Improved tissue quality noted as well as body mechanics demonstrated after cueing and corrections. Patient continues to require active therapy to progress functional capabilities with decreasing pain levels and improving mechanics with functional activities including progression of Home exercise program. Tolerance to today's treatment was good.   Patient appears motivated and compliant this date, demonstrating a working understanding of principals instructed and home program.    Plan:  Progress with functional strength as tolerated with respect to tissue healing. Manual therapy PRN to improve/maintain ROM, prevent adhesion, reduce pain.

## 2025-02-25 ENCOUNTER — TREATMENT (OUTPATIENT)
Dept: PHYSICAL THERAPY | Facility: CLINIC | Age: 75
End: 2025-02-25
Payer: MEDICARE

## 2025-02-25 DIAGNOSIS — M17.11 OSTEOARTHRITIS OF RIGHT KNEE, UNSPECIFIED OSTEOARTHRITIS TYPE: ICD-10-CM

## 2025-02-25 PROCEDURE — 97110 THERAPEUTIC EXERCISES: CPT | Mod: GP | Performed by: GENERAL ACUTE CARE HOSPITAL

## 2025-02-25 NOTE — PROGRESS NOTES
Patient Name: Charlotte Daniels  MRN: [id number]  Today's Date: 2/25/2025  Time Calculation  Start Time: 1400  Stop Time: 1441  Time Calculation (min): 41 min  Current Problem:  1. Osteoarthritis of right knee, unspecified osteoarthritis type  Follow Up In Physical Therapy          Visit 20/20    Subjective:  Change in pain/ pain level: 1/10  Change in function: able to do steps, but for the most part well   Patient comments: knee is not bad.     Objective: RUSSELL: decrease better improved stair climb  RDFIS on step: decreas better improved descending stairs     Treatment:    Therapeutic exercise (11691):  MDT review, education HEP  Knee - RUSSELL  Ankle RDFIS on step       Assessment:  Charlotte has made great progress with ability to ambulate, negotiate stairs and use exercise for symptom improvement.     Plan:  Hold 30 days and DC

## 2025-06-16 DIAGNOSIS — E55.9 VITAMIN D DEFICIENCY: Primary | ICD-10-CM

## 2025-06-16 DIAGNOSIS — E04.1 LEFT THYROID NODULE: Primary | ICD-10-CM

## 2025-07-03 ENCOUNTER — APPOINTMENT (OUTPATIENT)
Dept: PRIMARY CARE | Facility: CLINIC | Age: 75
End: 2025-07-03
Payer: MEDICARE

## 2025-07-03 VITALS
OXYGEN SATURATION: 97 % | RESPIRATION RATE: 16 BRPM | SYSTOLIC BLOOD PRESSURE: 121 MMHG | HEART RATE: 73 BPM | DIASTOLIC BLOOD PRESSURE: 82 MMHG | BODY MASS INDEX: 33.13 KG/M2 | WEIGHT: 187 LBS | HEIGHT: 63 IN

## 2025-07-03 DIAGNOSIS — Z12.31 ENCOUNTER FOR SCREENING MAMMOGRAM FOR MALIGNANT NEOPLASM OF BREAST: ICD-10-CM

## 2025-07-03 DIAGNOSIS — M17.11 PRIMARY OSTEOARTHRITIS OF RIGHT KNEE: Primary | ICD-10-CM

## 2025-07-03 DIAGNOSIS — E89.0 HISTORY OF PARTIAL THYROIDECTOMY: ICD-10-CM

## 2025-07-03 DIAGNOSIS — I83.892 VARICOSE VEINS OF LEFT LOWER EXTREMITY WITH COMPLICATIONS: ICD-10-CM

## 2025-07-03 PROCEDURE — 99213 OFFICE O/P EST LOW 20 MIN: CPT | Performed by: NURSE PRACTITIONER

## 2025-07-03 PROCEDURE — 1159F MED LIST DOCD IN RCRD: CPT | Performed by: NURSE PRACTITIONER

## 2025-07-03 PROCEDURE — 3008F BODY MASS INDEX DOCD: CPT | Performed by: NURSE PRACTITIONER

## 2025-07-03 NOTE — PROGRESS NOTES
Subjective   Charlotte Daniels is a 75 y.o. female who presents for Follow-up and Med Check.  Patient presents for a 6 month Follow up med check.    Patient stated would like to discuss about her mammogram     She has left ankle pain and right knee pain. She has been going to Physical therapy with improvement. She has been noticing more leg swelling in both legs. She wears compression stockings with improvement.    She has varicose veins and had a sore on her leg because of weak veins.       Review of Systems   Constitutional:  Negative for chills, fatigue and fever.   HENT:  Negative for rhinorrhea and sore throat.    Respiratory:  Negative for shortness of breath.    Cardiovascular:  Negative for chest pain.   Gastrointestinal:  Negative for constipation and diarrhea.   Genitourinary:  Negative for dysuria.   Musculoskeletal:  Positive for myalgias.   Skin:  Positive for wound.   Neurological:  Negative for headaches.   Psychiatric/Behavioral:  The patient is not nervous/anxious.    All other systems reviewed and are negative.      Objective   Physical Exam  Constitutional:       Appearance: Normal appearance.   Cardiovascular:      Rate and Rhythm: Normal rate and regular rhythm.      Pulses: Normal pulses.      Heart sounds: Normal heart sounds.   Pulmonary:      Effort: Pulmonary effort is normal.      Breath sounds: Normal breath sounds.   Abdominal:      General: Bowel sounds are normal.      Palpations: Abdomen is soft.   Musculoskeletal:         General: Normal range of motion.      Right knee: Bony tenderness (anterior aspect) present.      Left knee: Normal.      Right lower le+ Pitting Edema present.      Left lower le+ Pitting Edema present.   Skin:     General: Skin is warm and dry.   Neurological:      Mental Status: She is alert.   Psychiatric:         Mood and Affect: Mood normal.         Behavior: Behavior normal.       /82 (BP Location: Left arm, Patient Position: Sitting, BP Cuff  "Size: Adult)   Pulse 73   Resp 16   Ht 1.6 m (5' 3\")   Wt 84.8 kg (187 lb)   SpO2 97%   BMI 33.13 kg/m²       Assessment/Plan   Problem List Items Addressed This Visit       Varicose veins of left lower extremity with complications    History of partial thyroidectomy    Osteoarthritis of right knee - Primary     Other Visit Diagnoses         Encounter for screening mammogram for malignant neoplasm of breast              Patient Instructions   Continue meds as ordered. Follow-up in 6 months for physical, or sooner if needed. Call the office if any problems or concerns in the meantime.      "

## 2025-07-07 DIAGNOSIS — Z12.31 ENCOUNTER FOR SCREENING MAMMOGRAM FOR MALIGNANT NEOPLASM OF BREAST: Primary | ICD-10-CM

## 2025-07-08 LAB
25(OH)D3+25(OH)D2 SERPL-MCNC: 54 NG/ML (ref 30–100)
T3FREE SERPL-MCNC: 2.9 PG/ML (ref 2.3–4.2)
T4 FREE SERPL-MCNC: 1.1 NG/DL (ref 0.8–1.8)
TSH SERPL-ACNC: 3.56 MIU/L (ref 0.4–4.5)

## 2025-07-13 ASSESSMENT — ENCOUNTER SYMPTOMS
NERVOUS/ANXIOUS: 0
WOUND: 1
FEVER: 0
HEADACHES: 0
DYSURIA: 0
MYALGIAS: 1
SORE THROAT: 0
SHORTNESS OF BREATH: 0
DIARRHEA: 0
RHINORRHEA: 0
FATIGUE: 0
CONSTIPATION: 0
CHILLS: 0

## 2025-07-13 NOTE — PATIENT INSTRUCTIONS
Continue meds as ordered. Follow-up in 6 months for physical, or sooner if needed. Call the office if any problems or concerns in the meantime.

## 2025-12-10 ENCOUNTER — APPOINTMENT (OUTPATIENT)
Dept: RADIOLOGY | Facility: HOSPITAL | Age: 75
End: 2025-12-10
Payer: MEDICARE

## 2025-12-10 ENCOUNTER — APPOINTMENT (OUTPATIENT)
Dept: RADIOLOGY | Facility: CLINIC | Age: 75
End: 2025-12-10
Payer: MEDICARE

## 2025-12-10 ENCOUNTER — APPOINTMENT (OUTPATIENT)
Dept: SURGERY | Facility: CLINIC | Age: 75
End: 2025-12-10
Payer: MEDICARE

## 2025-12-17 ENCOUNTER — APPOINTMENT (OUTPATIENT)
Dept: RADIOLOGY | Facility: HOSPITAL | Age: 75
End: 2025-12-17
Payer: MEDICARE

## 2026-01-06 ENCOUNTER — APPOINTMENT (OUTPATIENT)
Dept: PRIMARY CARE | Facility: CLINIC | Age: 76
End: 2026-01-06
Payer: MEDICARE